# Patient Record
Sex: MALE | Race: WHITE | Employment: FULL TIME | URBAN - METROPOLITAN AREA
[De-identification: names, ages, dates, MRNs, and addresses within clinical notes are randomized per-mention and may not be internally consistent; named-entity substitution may affect disease eponyms.]

---

## 2017-02-02 ENCOUNTER — OFFICE VISIT (OUTPATIENT)
Dept: FAMILY MEDICINE CLINIC | Age: 57
End: 2017-02-02

## 2017-02-02 VITALS
DIASTOLIC BLOOD PRESSURE: 74 MMHG | WEIGHT: 187.6 LBS | BODY MASS INDEX: 26.16 KG/M2 | TEMPERATURE: 97.5 F | HEART RATE: 59 BPM | SYSTOLIC BLOOD PRESSURE: 136 MMHG

## 2017-02-02 DIAGNOSIS — J00 ACUTE NASOPHARYNGITIS: ICD-10-CM

## 2017-02-02 DIAGNOSIS — Z00.00 PHYSICAL EXAM, ROUTINE: Primary | ICD-10-CM

## 2017-02-02 DIAGNOSIS — Z13.6 SCREENING FOR CARDIOVASCULAR CONDITION: ICD-10-CM

## 2017-02-02 DIAGNOSIS — Z13.1 SCREENING FOR DIABETES MELLITUS: ICD-10-CM

## 2017-02-02 DIAGNOSIS — Z23 NEED FOR TDAP VACCINATION: ICD-10-CM

## 2017-02-02 DIAGNOSIS — Z13.21 ENCOUNTER FOR VITAMIN DEFICIENCY SCREENING: ICD-10-CM

## 2017-02-02 LAB
A/G RATIO: 2.2 (ref 1.1–2.2)
ALBUMIN SERPL-MCNC: 4.7 G/DL (ref 3.4–5)
ALP BLD-CCNC: 62 U/L (ref 40–129)
ALT SERPL-CCNC: 28 U/L (ref 10–40)
ANION GAP SERPL CALCULATED.3IONS-SCNC: 15 MMOL/L (ref 3–16)
AST SERPL-CCNC: 20 U/L (ref 15–37)
BILIRUB SERPL-MCNC: 0.8 MG/DL (ref 0–1)
BUN BLDV-MCNC: 16 MG/DL (ref 7–20)
CALCIUM SERPL-MCNC: 9.4 MG/DL (ref 8.3–10.6)
CHLORIDE BLD-SCNC: 105 MMOL/L (ref 99–110)
CHOLESTEROL, TOTAL: 203 MG/DL (ref 0–199)
CO2: 24 MMOL/L (ref 21–32)
CREAT SERPL-MCNC: 1 MG/DL (ref 0.9–1.3)
GFR AFRICAN AMERICAN: >60
GFR NON-AFRICAN AMERICAN: >60
GLOBULIN: 2.1 G/DL
GLUCOSE BLD-MCNC: 106 MG/DL (ref 70–99)
HDLC SERPL-MCNC: 52 MG/DL (ref 40–60)
LDL CHOLESTEROL CALCULATED: 125 MG/DL
POTASSIUM SERPL-SCNC: 4.7 MMOL/L (ref 3.5–5.1)
SODIUM BLD-SCNC: 144 MMOL/L (ref 136–145)
TOTAL PROTEIN: 6.8 G/DL (ref 6.4–8.2)
TRIGL SERPL-MCNC: 132 MG/DL (ref 0–150)
VITAMIN D 25-HYDROXY: 38.4 NG/ML
VLDLC SERPL CALC-MCNC: 26 MG/DL

## 2017-02-02 PROCEDURE — 36415 COLL VENOUS BLD VENIPUNCTURE: CPT | Performed by: FAMILY MEDICINE

## 2017-02-02 PROCEDURE — 90715 TDAP VACCINE 7 YRS/> IM: CPT | Performed by: FAMILY MEDICINE

## 2017-02-02 PROCEDURE — 99396 PREV VISIT EST AGE 40-64: CPT | Performed by: FAMILY MEDICINE

## 2017-02-02 PROCEDURE — 90471 IMMUNIZATION ADMIN: CPT | Performed by: FAMILY MEDICINE

## 2017-02-02 RX ORDER — FLUTICASONE PROPIONATE 50 MCG
1 SPRAY, SUSPENSION (ML) NASAL DAILY
Qty: 1 BOTTLE | Refills: 0 | Status: SHIPPED | OUTPATIENT
Start: 2017-02-02 | End: 2019-01-14

## 2017-06-15 ENCOUNTER — OFFICE VISIT (OUTPATIENT)
Dept: FAMILY MEDICINE CLINIC | Age: 57
End: 2017-06-15

## 2017-06-15 VITALS
BODY MASS INDEX: 27.11 KG/M2 | HEART RATE: 74 BPM | WEIGHT: 194.4 LBS | TEMPERATURE: 97 F | DIASTOLIC BLOOD PRESSURE: 84 MMHG | SYSTOLIC BLOOD PRESSURE: 128 MMHG

## 2017-06-15 DIAGNOSIS — M54.2 NECK PAIN: Primary | ICD-10-CM

## 2017-06-15 DIAGNOSIS — Z72.0 TOBACCO USE: ICD-10-CM

## 2017-06-15 PROCEDURE — 3017F COLORECTAL CA SCREEN DOC REV: CPT | Performed by: FAMILY MEDICINE

## 2017-06-15 PROCEDURE — 99214 OFFICE O/P EST MOD 30 MIN: CPT | Performed by: FAMILY MEDICINE

## 2017-06-15 PROCEDURE — G8427 DOCREV CUR MEDS BY ELIG CLIN: HCPCS | Performed by: FAMILY MEDICINE

## 2017-06-15 PROCEDURE — G8419 CALC BMI OUT NRM PARAM NOF/U: HCPCS | Performed by: FAMILY MEDICINE

## 2017-06-15 PROCEDURE — 4004F PT TOBACCO SCREEN RCVD TLK: CPT | Performed by: FAMILY MEDICINE

## 2017-06-15 RX ORDER — VARENICLINE TARTRATE 25 MG
KIT ORAL
Qty: 1 EACH | Refills: 0 | Status: SHIPPED | OUTPATIENT
Start: 2017-06-15 | End: 2018-04-18 | Stop reason: SDUPTHER

## 2017-06-15 RX ORDER — NAPROXEN 500 MG/1
500 TABLET ORAL 2 TIMES DAILY WITH MEALS
Qty: 60 TABLET | Refills: 2 | Status: SHIPPED | OUTPATIENT
Start: 2017-06-15 | End: 2019-01-14

## 2017-06-15 RX ORDER — CYCLOBENZAPRINE HCL 10 MG
10 TABLET ORAL EVERY 8 HOURS PRN
Qty: 30 TABLET | Refills: 2 | Status: SHIPPED | OUTPATIENT
Start: 2017-06-15 | End: 2018-06-18

## 2017-12-14 ENCOUNTER — OFFICE VISIT (OUTPATIENT)
Dept: FAMILY MEDICINE CLINIC | Age: 57
End: 2017-12-14

## 2017-12-14 VITALS
OXYGEN SATURATION: 99 % | SYSTOLIC BLOOD PRESSURE: 114 MMHG | DIASTOLIC BLOOD PRESSURE: 82 MMHG | HEIGHT: 71 IN | WEIGHT: 192 LBS | BODY MASS INDEX: 26.88 KG/M2 | HEART RATE: 72 BPM

## 2017-12-14 DIAGNOSIS — S93.492A SPRAIN OF ANTERIOR TALOFIBULAR LIGAMENT OF LEFT ANKLE, INITIAL ENCOUNTER: ICD-10-CM

## 2017-12-14 DIAGNOSIS — Z23 NEED FOR INFLUENZA VACCINATION: ICD-10-CM

## 2017-12-14 DIAGNOSIS — Z72.0 TOBACCO USE: ICD-10-CM

## 2017-12-14 DIAGNOSIS — Z23 NEED FOR PNEUMOCOCCAL VACCINATION: ICD-10-CM

## 2017-12-14 DIAGNOSIS — Z13.1 SCREENING FOR DIABETES MELLITUS: ICD-10-CM

## 2017-12-14 DIAGNOSIS — Z00.00 PHYSICAL EXAM, ANNUAL: Primary | ICD-10-CM

## 2017-12-14 DIAGNOSIS — Z13.6 SCREENING FOR CARDIOVASCULAR CONDITION: ICD-10-CM

## 2017-12-14 LAB
A/G RATIO: 1.9 (CALC) (ref 0.8–2.6)
ALBUMIN SERPL-MCNC: 4.6 GM/DL (ref 3.5–5.2)
ALP BLD-CCNC: 57 U/L (ref 23–144)
ALT SERPL-CCNC: 26 U/L (ref 0–60)
AST SERPL-CCNC: 21 U/L (ref 0–46)
BILIRUB SERPL-MCNC: 0.7 MG/DL (ref 0–1.2)
BUN / CREAT RATIO: 15 (CALC) (ref 7–25)
BUN BLDV-MCNC: 15 MG/DL (ref 3–29)
CALCIUM SERPL-MCNC: 9.4 MG/DL (ref 8.5–10.5)
CHLORIDE BLD-SCNC: 100 MEQ/L (ref 96–110)
CHOLESTEROL, TOTAL: 175 MG/DL
CO2: 31 MEQ/L (ref 19–32)
COPY(IES) SENT TO:: NORMAL
CREAT SERPL-MCNC: 1 MG/DL
GFR SERPL CREATININE-BSD FRML MDRD: 84 ML/MIN/1.73M2
GLOBULIN: 2.4 GM/DL (CALC) (ref 1.9–3.6)
GLUCOSE BLD-MCNC: 94 MG/DL
HDLC SERPL-MCNC: 52 MG/DL
LDL CHOLESTEROL: 101 MG/DL (CALC)
POTASSIUM SERPL-SCNC: 4.4 MEQ/L (ref 3.4–5.3)
SODIUM BLD-SCNC: 141 MEQ/L (ref 135–148)
TOTAL PROTEIN: 7 GM/DL (ref 6–8.3)
TRIGL SERPL-MCNC: 108 MG/DL
VLDLC SERPL CALC-MCNC: 22 MG/DL (CALC) (ref 4–38)

## 2017-12-14 PROCEDURE — 90732 PPSV23 VACC 2 YRS+ SUBQ/IM: CPT | Performed by: FAMILY MEDICINE

## 2017-12-14 PROCEDURE — 90688 IIV4 VACCINE SPLT 0.5 ML IM: CPT | Performed by: FAMILY MEDICINE

## 2017-12-14 PROCEDURE — 36415 COLL VENOUS BLD VENIPUNCTURE: CPT | Performed by: FAMILY MEDICINE

## 2017-12-14 PROCEDURE — 90472 IMMUNIZATION ADMIN EACH ADD: CPT | Performed by: FAMILY MEDICINE

## 2017-12-14 PROCEDURE — 99396 PREV VISIT EST AGE 40-64: CPT | Performed by: FAMILY MEDICINE

## 2017-12-14 PROCEDURE — 90471 IMMUNIZATION ADMIN: CPT | Performed by: FAMILY MEDICINE

## 2017-12-14 ASSESSMENT — PATIENT HEALTH QUESTIONNAIRE - PHQ9
SUM OF ALL RESPONSES TO PHQ9 QUESTIONS 1 & 2: 0
SUM OF ALL RESPONSES TO PHQ QUESTIONS 1-9: 0
1. LITTLE INTEREST OR PLEASURE IN DOING THINGS: 0
2. FEELING DOWN, DEPRESSED OR HOPELESS: 0

## 2017-12-14 NOTE — PROGRESS NOTES
Subjective:   Chief Complaint:     Jose Rascon is a 56 y.o. male who presents for a  physical examination. History of Present Illness:      Feels well. Active in [employer]. Sleeps well. Ankle Pain: Patient complains of left ankle pain. Onset of the symptoms was 3 months ago. Inciting event: none known. Current symptoms include ability to bear weight, but with some pain and pain at the lateral aspect of the ankle. Aggravating symptoms: dorsiflexion. Patient's overall course: symptoms have progressed to a point and plateaued. Patient has had no prior ankle problems. Previous visits for this problem: none. Evaluation to date: none. Treatment to date: avoidance of offending activity. Past Medical History:   Diagnosis Date    Hypertension     MRSA (methicillin resistant Staphylococcus aureus) \"2010 And Early 2012\"    \"Butt Twice\"    Neck problem     \"Get Adjusted As Needed  Sees Dr. Opal Pepper"    Pneumonia \"As A Child\"    No Current Symtoms    Shortness of breath on exertion     Tinnitus of both ears         Review of patient's past surgical history indicates:     Past Surgical History:   Procedure Laterality Date    CARPAL TUNNEL RELEASE  2005    Right    CATARACT REMOVAL      bid    COLONOSCOPY  2010    due 2020    FINGER SURGERY  1976    Reattached Right Little Finger Due To Table Saw Accident   [facility]  9/11/2012    Left     OTHER SURGICAL HISTORY      Family Physician Is Dr. Lucero Osorio. [place], [place] SHOULDER ARTHROSCOPY  2000's    Left     TONSILLECTOMY  1968    VASECTOMY  1999                                                   Current Outpatient Prescriptions   Medication Sig Dispense Refill    cyclobenzaprine (FLEXERIL) 10 MG tablet Take 1 tablet by mouth every 8 hours as needed for Muscle spasms 30 tablet 2    varenicline (CHANTIX STARTING MONTH PAK) 0.5 MG X 11 & 1 MG X 42 tablet By mouth.  1 each 0    naproxen (NAPROSYN) 500 MG tablet Take 1 tablet by mouth 2 times daily (with meals) 60 tablet 2    fluticasone (FLONASE) 50 MCG/ACT nasal spray 1 spray by Nasal route daily 1 Bottle 0    Ibuprofen (ADVIL PO) Take  by mouth as needed. Over The Counter       UNABLE TO FIND Force factor          No current facility-administered medications for this visit. No Known Allergies    Social History   Substance Use Topics    Smoking status: Current Every Day Smoker     Packs/day: 1.00     Years: 34.00     Types: Cigarettes     Last attempt to quit: 1/7/2013    Smokeless tobacco: Current User    Alcohol use No        Family History   Problem Relation Age of Onset    Heart Disease Mother     Hypertension Mother     Elevated Lipids Mother     Cancer Mother      \"Breast Cancer\"    Mental Illness Mother      \"Anxiety\"    Cancer Sister      \"Thyroid Cancer\"    Hypertension Father     Elevated Lipids Father     Other Father      \"Stents In Legs\"    Heart Disease Father      \"Heart Stent\"   [facility] Other Brother      \"Migraines\"   [facility] Migraines Brother     Thyroid Disease Sister         Review Of Systems    Skin: no abnormal pigmentation, rash, scaling, itching, masses, hair or nail changes  Eyes: negative  Ears/Nose/Throat: negative  Respiratory: negative  Cardiovascular: negative  Gastrointestinal: negative  Genitourinary: negative  Musculoskeletal: negative  Neurologic: negative  Psychiatric: negative  Hematologic/Lymphatic/Immunologic: negative  Endocrine: negative       Objective:      /82 (Site: Left Arm, Position: Sitting, Cuff Size: Medium Adult)   Pulse 72   Ht 5' 10.5\" (1.791 m)   Wt 192 lb (87.1 kg)   SpO2 99%   BMI 27.16 kg/m²   General appearance - healthy, alert, no distress  Skin - Skin color, texture, turgor normal. No rashes or lesions. Head - Normocephalic. No masses, lesions, tenderness or abnormalities  Eyes - conjunctivae/corneas clear. PERRL, EOM's intact.   Ears - External ears normal. Canals clear. TM's normal.  Nose/Sinuses - Nares normal. Septum midline. Mucosa normal. No drainage or sinus tenderness. Oropharynx - Lips, mucosa, and tongue normal. Teeth and gums normal.   Neck - Neck supple. No adenopathy. Thyroid symmetric, normal size,  Back - Back symmetric, no curvature. ROM normal. No CVA tenderness. Lungs - Percussion normal. Good diaphragmatic excursion. Lungs clear  Heart - Regular rate and rhythm, with no rub, murmur or gallop noted. Abdomen - Abdomen soft, non-tender. BS normal. No masses, organomegaly  Extremities - Extremities normal. No deformities, edema, or skin discoloration. Left ankle with FROM. No tenderness or swelling. Pain with dorsiflexion . Normal gait  Musculoskeletal - Spine ROM normal. Muscular strength intact. Peripheral pulses - radial=4/4  Neuro - Gait normal. Reflexes normal and symmetric. Sensation grossly normal.  No focal weakness           Assessment:        1. Physical exam, annual     2. Need for influenza vaccination  INFLUENZA, QUADV, 3 YRS AND OLDER, IM, MDV, 0.5ML (FLUZONE QUADV)   3. Need for pneumococcal vaccination  Pneumococcal polysaccharide vaccine 23-valent >= 1yo subcutaneous/IM (PNEUMOVAX 23)   4. Sprain of anterior talofibular ligament of left ankle, initial encounter  XR ANKLE LEFT (MIN 3 VIEWS)   5. Tobacco use     6. Screening for cardiovascular condition  Lipid Panel   7.  Screening for diabetes mellitus  Comprehensive Metabolic Panel                 Plan:        See orders  Exercises given

## 2017-12-14 NOTE — PATIENT INSTRUCTIONS
Patient Education        Ankle Sprain: Rehab Exercises  Your Care Instructions  Here are some examples of typical rehabilitation exercises for your condition. Start each exercise slowly. Ease off the exercise if you start to have pain. Your doctor or physical therapist will tell you when you can start these exercises and which ones will work best for you. How to do the exercises  \"Alphabet\" exercise    1. Trace the alphabet with your toe. This helps your ankle move in all directions. Side-to-side knee swing exercise    1. Sit in a chair with your foot flat on the floor. 2. Slowly move your knee from side to side. Keep your foot pressed flat. 3. Continue this exercise for 2 to 3 minutes. Towel curl    1. While sitting, place your foot on a towel on the floor. Scrunch the towel toward you with your toes. 2. Then use your toes to push the towel away from you. 3. To make this exercise more challenging you can put something on the other end of the towel. A can of soup is about the right weight for this. Towel stretch    1. Sit with your legs extended and knees straight. 2. Place a towel around your foot just under the toes. 3. Hold each end of the towel in each hand, with your hands above your knees. 4. Pull back with the towel so that your foot stretches toward you. 5. Hold the position for at least 15 to 30 seconds. 6. Repeat 2 to 4 times a session. Do up to 5 sessions a day. Ankle eversion exercise    1. Start by sitting with your foot flat on the floor. Push your foot outward against a wall or a piece of furniture that doesn't move. Hold for about 6 seconds, and relax. Repeat 8 to 12 times. 2. After you feel comfortable with this, try using rubber tubing looped around the outside of your feet for resistance. Push your foot out to the side against the tubing, and then count to 10 as you slowly bring your foot back to the middle. Repeat 8 to 12 times. Isometric opposition exercises    1.  While sitting, put your feet together flat on the floor. 2. Press your injured foot inward against your other foot. Hold for about 6 seconds, and relax. Repeat 8 to 12 times. 3. Then place the heel of your other foot on top of the injured one. Push down with the top heel while trying to push up with your injured foot. Hold for about 6 seconds, and relax. Repeat 8 to 12 times. Resisted ankle inversion    1. Sit on the floor with your good leg crossed over your other leg. 2. Hold both ends of an exercise band and loop the band around the inside of your affected foot. Then press your other foot against the band. 3. Keeping your legs crossed, slowly push your affected foot against the band so that foot moves away from your other foot. Then slowly relax. 4. Repeat 8 to 12 times. Resisted ankle eversion    1. Sit on the floor with your legs straight. 2. Hold both ends of an exercise band and loop the band around the outside of your affected foot. Then press your other foot against the band. 3. Keeping your leg straight, slowly push your affected foot outward against the band and away from your other foot without letting your leg rotate. Then slowly relax. 4. Repeat 8 to 12 times. Resisted ankle dorsiflexion    1. Tie the ends of an exercise band together to form a loop. Attach one end of the loop to a secure object or shut a door on it to hold it in place. (Or you can have someone hold one end of the loop to provide resistance.)  2. While sitting on the floor or in a chair, loop the other end of the band over the top of your affected foot. 3. Keeping your knee and leg straight, slowly flex your foot to pull back on the exercise band, and then slowly relax. 4. Repeat 8 to 12 times. Single-leg balance    1. Stand on a flat surface with your arms stretched out to your sides like you are making the letter \"T. \" Then lift your good leg off the floor, bending it at the knee.  If you are not steady on your feet, use one hand to hold on to a chair, counter, or wall. 2. Standing on the leg with your affected ankle, keep that knee straight. Try to balance on that leg for up to 30 seconds. Then rest for up to 10 seconds. 3. Repeat 6 to 8 times. 4. When you can balance on your affected leg for 30 seconds with your eyes open, try to balance on it with your eyes closed. 5. When you can do this exercise with your eyes closed for 30 seconds and with ease and no pain, try standing on a pillow or piece of foam, and repeat steps 1 through 4. Follow-up care is a key part of your treatment and safety. Be sure to make and go to all appointments, and call your doctor if you are having problems. It's also a good idea to know your test results and keep a list of the medicines you take. Where can you learn more? Go to https://chpepiceweb.[employer] org and sign in to your [employer] account. Enter Marianna Wong in the aisle411 box to learn more about \"Ankle Sprain: Rehab Exercises. \"     If you do not have an account, please click on the \"Sign Up Now\" link. Current as of: March 21, 2017  Content Version: 11.4  © 6328-9848 Healthwise, Incorporated. Care instructions adapted under license by [facility] ([facility]). If you have questions about a medical condition or this instruction, always ask your healthcare professional. [place] [address] any warranty or liability for your use of this information.

## 2018-01-09 ENCOUNTER — TELEPHONE (OUTPATIENT)
Dept: FAMILY MEDICINE CLINIC | Age: 58
End: 2018-01-09

## 2018-01-10 ENCOUNTER — OFFICE VISIT (OUTPATIENT)
Dept: FAMILY MEDICINE CLINIC | Age: 58
End: 2018-01-10

## 2018-01-10 VITALS
OXYGEN SATURATION: 97 % | HEIGHT: 70 IN | TEMPERATURE: 96.8 F | BODY MASS INDEX: 28.03 KG/M2 | HEART RATE: 84 BPM | SYSTOLIC BLOOD PRESSURE: 138 MMHG | DIASTOLIC BLOOD PRESSURE: 88 MMHG | WEIGHT: 195.8 LBS

## 2018-01-10 DIAGNOSIS — M65.4 DE QUERVAIN'S TENOSYNOVITIS, LEFT: Primary | ICD-10-CM

## 2018-01-10 PROCEDURE — 99213 OFFICE O/P EST LOW 20 MIN: CPT | Performed by: FAMILY MEDICINE

## 2018-04-18 ENCOUNTER — OFFICE VISIT (OUTPATIENT)
Dept: FAMILY MEDICINE CLINIC | Age: 58
End: 2018-04-18

## 2018-04-18 VITALS
SYSTOLIC BLOOD PRESSURE: 142 MMHG | TEMPERATURE: 97 F | OXYGEN SATURATION: 94 % | WEIGHT: 195.4 LBS | BODY MASS INDEX: 28.04 KG/M2 | HEART RATE: 65 BPM | DIASTOLIC BLOOD PRESSURE: 82 MMHG

## 2018-04-18 DIAGNOSIS — J22 LOWER RESPIRATORY INFECTION: Primary | ICD-10-CM

## 2018-04-18 DIAGNOSIS — Z72.0 TOBACCO USE: ICD-10-CM

## 2018-04-18 PROCEDURE — 99214 OFFICE O/P EST MOD 30 MIN: CPT | Performed by: FAMILY MEDICINE

## 2018-04-18 RX ORDER — DEXTROMETHORPHAN HYDROBROMIDE AND PROMETHAZINE HYDROCHLORIDE 15; 6.25 MG/5ML; MG/5ML
5 SYRUP ORAL 4 TIMES DAILY PRN
Qty: 240 ML | Refills: 0 | Status: SHIPPED | OUTPATIENT
Start: 2018-04-18 | End: 2018-06-18

## 2018-04-18 RX ORDER — AMOXICILLIN AND CLAVULANATE POTASSIUM 875; 125 MG/1; MG/1
1 TABLET, FILM COATED ORAL 2 TIMES DAILY
Qty: 20 TABLET | Refills: 0 | Status: SHIPPED | OUTPATIENT
Start: 2018-04-18 | End: 2018-04-28

## 2018-04-18 RX ORDER — VARENICLINE TARTRATE 25 MG
KIT ORAL
Qty: 1 EACH | Refills: 0 | Status: SHIPPED | OUTPATIENT
Start: 2018-04-18 | End: 2018-06-18

## 2018-04-18 RX ORDER — BENZONATATE 200 MG/1
200 CAPSULE ORAL 3 TIMES DAILY PRN
Qty: 30 CAPSULE | Refills: 0 | Status: SHIPPED | OUTPATIENT
Start: 2018-04-18 | End: 2018-06-18

## 2018-06-18 ENCOUNTER — OFFICE VISIT (OUTPATIENT)
Dept: FAMILY MEDICINE CLINIC | Age: 58
End: 2018-06-18

## 2018-06-18 VITALS
SYSTOLIC BLOOD PRESSURE: 136 MMHG | TEMPERATURE: 96.9 F | WEIGHT: 195.6 LBS | OXYGEN SATURATION: 86 % | DIASTOLIC BLOOD PRESSURE: 86 MMHG | BODY MASS INDEX: 28.07 KG/M2 | HEART RATE: 86 BPM

## 2018-06-18 DIAGNOSIS — I10 ESSENTIAL HYPERTENSION: Primary | ICD-10-CM

## 2018-06-18 DIAGNOSIS — F17.200 TOBACCO USE DISORDER: ICD-10-CM

## 2018-06-18 PROCEDURE — 99213 OFFICE O/P EST LOW 20 MIN: CPT | Performed by: FAMILY MEDICINE

## 2018-06-18 RX ORDER — LISINOPRIL 10 MG/1
10 TABLET ORAL DAILY
Qty: 30 TABLET | Refills: 0 | Status: SHIPPED | OUTPATIENT
Start: 2018-06-18 | End: 2018-07-26 | Stop reason: SDUPTHER

## 2018-06-18 ASSESSMENT — PATIENT HEALTH QUESTIONNAIRE - PHQ9
2. FEELING DOWN, DEPRESSED OR HOPELESS: 0
1. LITTLE INTEREST OR PLEASURE IN DOING THINGS: 0
SUM OF ALL RESPONSES TO PHQ QUESTIONS 1-9: 0
SUM OF ALL RESPONSES TO PHQ9 QUESTIONS 1 & 2: 0

## 2018-07-02 ENCOUNTER — NURSE ONLY (OUTPATIENT)
Dept: FAMILY MEDICINE CLINIC | Age: 58
End: 2018-07-02

## 2018-07-02 VITALS — DIASTOLIC BLOOD PRESSURE: 80 MMHG | SYSTOLIC BLOOD PRESSURE: 110 MMHG

## 2018-07-02 DIAGNOSIS — I10 ESSENTIAL HYPERTENSION: ICD-10-CM

## 2018-07-02 PROCEDURE — 36415 COLL VENOUS BLD VENIPUNCTURE: CPT | Performed by: FAMILY MEDICINE

## 2018-07-02 NOTE — PROGRESS NOTES
Pt came in for 2 week check up with BP and draw blood for a BMP. Blood pressure was 110/80. Pt tolerated phlebotomy well.

## 2018-07-03 LAB
BUN / CREAT RATIO: 20 (CALC) (ref 7–25)
BUN BLDV-MCNC: 18 MG/DL (ref 3–29)
CALCIUM SERPL-MCNC: 9.4 MG/DL (ref 8.5–10.5)
CHLORIDE BLD-SCNC: 102 MEQ/L (ref 96–110)
CO2: 21 MEQ/L (ref 19–32)
COMMENT: ABNORMAL
COPY(IES) SENT TO:: NORMAL
CREAT SERPL-MCNC: 0.9 MG/DL
GFR SERPL CREATININE-BSD FRML MDRD: 95 ML/MIN/1.73M2
GLUCOSE BLD-MCNC: 116 MG/DL
POTASSIUM SERPL-SCNC: 4.2 MEQ/L (ref 3.4–5.3)
SODIUM BLD-SCNC: 140 MEQ/L (ref 135–148)

## 2018-07-26 DIAGNOSIS — I10 ESSENTIAL HYPERTENSION: ICD-10-CM

## 2018-07-26 RX ORDER — LISINOPRIL 10 MG/1
10 TABLET ORAL DAILY
Qty: 30 TABLET | Refills: 5 | Status: SHIPPED | OUTPATIENT
Start: 2018-07-26 | End: 2019-01-14 | Stop reason: SDUPTHER

## 2019-01-14 ENCOUNTER — OFFICE VISIT (OUTPATIENT)
Dept: FAMILY MEDICINE CLINIC | Age: 59
End: 2019-01-14
Payer: COMMERCIAL

## 2019-01-14 VITALS
DIASTOLIC BLOOD PRESSURE: 68 MMHG | BODY MASS INDEX: 28.18 KG/M2 | SYSTOLIC BLOOD PRESSURE: 118 MMHG | OXYGEN SATURATION: 98 % | WEIGHT: 196.8 LBS | HEIGHT: 70 IN | HEART RATE: 99 BPM | TEMPERATURE: 95.3 F

## 2019-01-14 DIAGNOSIS — I10 ESSENTIAL HYPERTENSION: ICD-10-CM

## 2019-01-14 DIAGNOSIS — Z13.1 SCREENING FOR DIABETES MELLITUS: ICD-10-CM

## 2019-01-14 DIAGNOSIS — Z13.6 SCREENING FOR CARDIOVASCULAR CONDITION: ICD-10-CM

## 2019-01-14 DIAGNOSIS — Z87.891 PERSONAL HISTORY OF TOBACCO USE: ICD-10-CM

## 2019-01-14 DIAGNOSIS — Z00.00 PHYSICAL EXAM, ANNUAL: Primary | ICD-10-CM

## 2019-01-14 DIAGNOSIS — Z12.2 ENCOUNTER FOR SCREENING FOR CANCER OF RESPIRATORY ORGANS: ICD-10-CM

## 2019-01-14 PROCEDURE — 36415 COLL VENOUS BLD VENIPUNCTURE: CPT | Performed by: FAMILY MEDICINE

## 2019-01-14 PROCEDURE — G0296 VISIT TO DETERM LDCT ELIG: HCPCS | Performed by: FAMILY MEDICINE

## 2019-01-14 PROCEDURE — 99396 PREV VISIT EST AGE 40-64: CPT | Performed by: FAMILY MEDICINE

## 2019-01-14 RX ORDER — LISINOPRIL 10 MG/1
10 TABLET ORAL DAILY
Qty: 30 TABLET | Refills: 5 | Status: SHIPPED | OUTPATIENT
Start: 2019-01-14 | End: 2019-08-29 | Stop reason: SDUPTHER

## 2019-01-14 ASSESSMENT — PATIENT HEALTH QUESTIONNAIRE - PHQ9
SUM OF ALL RESPONSES TO PHQ QUESTIONS 1-9: 0
1. LITTLE INTEREST OR PLEASURE IN DOING THINGS: 0
2. FEELING DOWN, DEPRESSED OR HOPELESS: 0
SUM OF ALL RESPONSES TO PHQ9 QUESTIONS 1 & 2: 0
SUM OF ALL RESPONSES TO PHQ QUESTIONS 1-9: 0

## 2019-01-15 LAB
A/G RATIO: 2 (CALC) (ref 0.8–2.6)
ALBUMIN SERPL-MCNC: 4.5 GM/DL (ref 3.5–5.2)
ALP BLD-CCNC: 51 U/L (ref 23–144)
ALT SERPL-CCNC: 52 U/L (ref 0–60)
AST SERPL-CCNC: 26 U/L (ref 0–46)
BILIRUB SERPL-MCNC: 1 MG/DL (ref 0–1.2)
BUN / CREAT RATIO: 13 (CALC) (ref 7–25)
BUN BLDV-MCNC: 13 MG/DL (ref 3–29)
CALCIUM SERPL-MCNC: 9.6 MG/DL (ref 8.5–10.5)
CHLORIDE BLD-SCNC: 102 MEQ/L (ref 96–110)
CHOLESTEROL, TOTAL: 186 MG/DL
CO2: 28 MEQ/L (ref 19–32)
COPY(IES) SENT TO:: NORMAL
CREAT SERPL-MCNC: 1 MG/DL
GFR SERPL CREATININE-BSD FRML MDRD: 84 ML/MIN/1.73M2
GLOBULIN: 2.2 GM/DL (CALC) (ref 1.9–3.6)
GLUCOSE BLD-MCNC: 104 MG/DL
HDLC SERPL-MCNC: 47 MG/DL
LDL CHOLESTEROL: 118 MG/DL (CALC)
POTASSIUM SERPL-SCNC: 4.7 MEQ/L (ref 3.4–5.3)
SODIUM BLD-SCNC: 142 MEQ/L (ref 135–148)
TOTAL PROTEIN: 6.7 GM/DL (ref 6–8.3)
TRIGL SERPL-MCNC: 105 MG/DL
VLDLC SERPL CALC-MCNC: 21 MG/DL (CALC) (ref 4–38)

## 2019-01-21 ENCOUNTER — HOSPITAL ENCOUNTER (OUTPATIENT)
Dept: CT IMAGING | Age: 59
Discharge: HOME | End: 2019-01-21
Payer: COMMERCIAL

## 2019-01-21 DIAGNOSIS — Z87.891 PERSONAL HISTORY OF TOBACCO USE: ICD-10-CM

## 2019-01-21 PROCEDURE — G0297 LDCT FOR LUNG CA SCREEN: HCPCS

## 2019-02-22 ENCOUNTER — TELEPHONE (OUTPATIENT)
Dept: FAMILY MEDICINE CLINIC | Age: 59
End: 2019-02-22

## 2019-02-27 ENCOUNTER — OFFICE VISIT (OUTPATIENT)
Dept: FAMILY MEDICINE CLINIC | Age: 59
End: 2019-02-27
Payer: COMMERCIAL

## 2019-02-27 VITALS
OXYGEN SATURATION: 95 % | WEIGHT: 197.8 LBS | TEMPERATURE: 96 F | DIASTOLIC BLOOD PRESSURE: 76 MMHG | SYSTOLIC BLOOD PRESSURE: 124 MMHG | HEART RATE: 100 BPM | BODY MASS INDEX: 28.18 KG/M2

## 2019-02-27 DIAGNOSIS — Z72.0 TOBACCO USE: ICD-10-CM

## 2019-02-27 DIAGNOSIS — I10 ESSENTIAL HYPERTENSION: Primary | ICD-10-CM

## 2019-02-27 PROCEDURE — 99214 OFFICE O/P EST MOD 30 MIN: CPT | Performed by: FAMILY MEDICINE

## 2019-02-27 ASSESSMENT — PATIENT HEALTH QUESTIONNAIRE - PHQ9
SUM OF ALL RESPONSES TO PHQ QUESTIONS 1-9: 0
SUM OF ALL RESPONSES TO PHQ9 QUESTIONS 1 & 2: 0
1. LITTLE INTEREST OR PLEASURE IN DOING THINGS: 0
2. FEELING DOWN, DEPRESSED OR HOPELESS: 0
SUM OF ALL RESPONSES TO PHQ QUESTIONS 1-9: 0

## 2019-03-01 ENCOUNTER — INITIAL CONSULT (OUTPATIENT)
Dept: CARDIOLOGY CLINIC | Age: 59
End: 2019-03-01
Payer: COMMERCIAL

## 2019-03-01 VITALS
HEIGHT: 70 IN | DIASTOLIC BLOOD PRESSURE: 88 MMHG | WEIGHT: 200.8 LBS | BODY MASS INDEX: 28.75 KG/M2 | SYSTOLIC BLOOD PRESSURE: 134 MMHG | HEART RATE: 70 BPM

## 2019-03-01 DIAGNOSIS — Z72.0 TOBACCO USE: ICD-10-CM

## 2019-03-01 DIAGNOSIS — Z72.0 TOBACCO CHEW USE: ICD-10-CM

## 2019-03-01 DIAGNOSIS — I73.9 CLAUDICATION (HCC): ICD-10-CM

## 2019-03-01 DIAGNOSIS — I10 ESSENTIAL HYPERTENSION: ICD-10-CM

## 2019-03-01 DIAGNOSIS — R42 DIZZINESS: ICD-10-CM

## 2019-03-01 DIAGNOSIS — R93.1 ELEVATED CORONARY ARTERY CALCIUM SCORE: ICD-10-CM

## 2019-03-01 DIAGNOSIS — I25.10 ASCVD (ARTERIOSCLEROTIC CARDIOVASCULAR DISEASE): Primary | ICD-10-CM

## 2019-03-01 PROCEDURE — 93000 ELECTROCARDIOGRAM COMPLETE: CPT | Performed by: INTERNAL MEDICINE

## 2019-03-01 PROCEDURE — 99244 OFF/OP CNSLTJ NEW/EST MOD 40: CPT | Performed by: INTERNAL MEDICINE

## 2019-03-01 RX ORDER — ATORVASTATIN CALCIUM 40 MG/1
40 TABLET, FILM COATED ORAL DAILY
Qty: 90 TABLET | Refills: 1 | Status: SHIPPED | OUTPATIENT
Start: 2019-03-01 | End: 2019-04-19 | Stop reason: SDUPTHER

## 2019-03-01 RX ORDER — ASPIRIN 81 MG/1
81 TABLET ORAL DAILY
Qty: 90 TABLET | Refills: 1 | Status: SHIPPED | OUTPATIENT
Start: 2019-03-01 | End: 2020-08-14 | Stop reason: SDUPTHER

## 2019-03-01 RX ORDER — VARENICLINE TARTRATE 0.5 MG/1
.5-1 TABLET, FILM COATED ORAL SEE ADMIN INSTRUCTIONS
Qty: 57 TABLET | Refills: 0 | Status: SHIPPED | OUTPATIENT
Start: 2019-03-01 | End: 2019-03-04

## 2019-03-04 ENCOUNTER — TELEPHONE (OUTPATIENT)
Dept: FAMILY MEDICINE CLINIC | Age: 59
End: 2019-03-04

## 2019-03-04 ENCOUNTER — TELEPHONE (OUTPATIENT)
Dept: CARDIOLOGY CLINIC | Age: 59
End: 2019-03-04

## 2019-03-04 RX ORDER — VARENICLINE TARTRATE 25 MG
KIT ORAL
Qty: 1 EACH | Refills: 0 | Status: SHIPPED | OUTPATIENT
Start: 2019-03-04 | End: 2019-04-04

## 2019-03-18 ENCOUNTER — PROCEDURE VISIT (OUTPATIENT)
Dept: CARDIOLOGY CLINIC | Age: 59
End: 2019-03-18
Payer: COMMERCIAL

## 2019-03-18 DIAGNOSIS — Z72.0 TOBACCO USE: ICD-10-CM

## 2019-03-18 DIAGNOSIS — Z72.0 TOBACCO CHEW USE: ICD-10-CM

## 2019-03-18 DIAGNOSIS — I10 ESSENTIAL HYPERTENSION: ICD-10-CM

## 2019-03-18 DIAGNOSIS — I73.9 CLAUDICATION (HCC): Primary | ICD-10-CM

## 2019-03-18 DIAGNOSIS — R06.02 SOB (SHORTNESS OF BREATH): Primary | ICD-10-CM

## 2019-03-18 DIAGNOSIS — R42 DIZZINESS: ICD-10-CM

## 2019-03-18 DIAGNOSIS — I25.10 ASCVD (ARTERIOSCLEROTIC CARDIOVASCULAR DISEASE): ICD-10-CM

## 2019-03-18 LAB
LV EF: 58 %
LVEF MODALITY: NORMAL

## 2019-03-18 PROCEDURE — 93922 UPR/L XTREMITY ART 2 LEVELS: CPT | Performed by: INTERNAL MEDICINE

## 2019-03-18 PROCEDURE — 93925 LOWER EXTREMITY STUDY: CPT | Performed by: INTERNAL MEDICINE

## 2019-03-18 PROCEDURE — 93306 TTE W/DOPPLER COMPLETE: CPT | Performed by: INTERNAL MEDICINE

## 2019-03-19 ENCOUNTER — PROCEDURE VISIT (OUTPATIENT)
Dept: CARDIOLOGY CLINIC | Age: 59
End: 2019-03-19
Payer: COMMERCIAL

## 2019-03-19 DIAGNOSIS — R42 DIZZINESS: ICD-10-CM

## 2019-03-19 DIAGNOSIS — I25.10 ASCVD (ARTERIOSCLEROTIC CARDIOVASCULAR DISEASE): ICD-10-CM

## 2019-03-19 DIAGNOSIS — I25.10 ASCVD (ARTERIOSCLEROTIC CARDIOVASCULAR DISEASE): Primary | ICD-10-CM

## 2019-03-19 DIAGNOSIS — I10 ESSENTIAL HYPERTENSION: ICD-10-CM

## 2019-03-19 DIAGNOSIS — R07.9 CHEST PAIN, UNSPECIFIED TYPE: Primary | ICD-10-CM

## 2019-03-19 DIAGNOSIS — Z72.0 TOBACCO CHEW USE: ICD-10-CM

## 2019-03-19 DIAGNOSIS — Z72.0 TOBACCO USE: ICD-10-CM

## 2019-03-19 LAB
LV EF: 63 %
LVEF MODALITY: NORMAL

## 2019-03-19 PROCEDURE — A9500 TC99M SESTAMIBI: HCPCS | Performed by: INTERNAL MEDICINE

## 2019-03-19 PROCEDURE — 76706 US ABDL AORTA SCREEN AAA: CPT | Performed by: INTERNAL MEDICINE

## 2019-03-19 PROCEDURE — 93015 CV STRESS TEST SUPVJ I&R: CPT | Performed by: INTERNAL MEDICINE

## 2019-03-19 PROCEDURE — 78452 HT MUSCLE IMAGE SPECT MULT: CPT | Performed by: INTERNAL MEDICINE

## 2019-03-27 ENCOUNTER — TELEPHONE (OUTPATIENT)
Dept: CARDIOLOGY CLINIC | Age: 59
End: 2019-03-27

## 2019-04-04 RX ORDER — VARENICLINE TARTRATE 25 MG
KIT ORAL
Qty: 1 EACH | Refills: 0 | OUTPATIENT
Start: 2019-04-04

## 2019-04-04 RX ORDER — VARENICLINE TARTRATE 1 MG/1
1 TABLET, FILM COATED ORAL 2 TIMES DAILY
Qty: 60 TABLET | Refills: 1 | Status: SHIPPED | OUTPATIENT
Start: 2019-04-04 | End: 2020-07-15

## 2019-04-19 ENCOUNTER — OFFICE VISIT (OUTPATIENT)
Dept: CARDIOLOGY CLINIC | Age: 59
End: 2019-04-19
Payer: COMMERCIAL

## 2019-04-19 VITALS
HEIGHT: 70 IN | SYSTOLIC BLOOD PRESSURE: 130 MMHG | BODY MASS INDEX: 29.35 KG/M2 | HEART RATE: 80 BPM | DIASTOLIC BLOOD PRESSURE: 80 MMHG | WEIGHT: 205 LBS

## 2019-04-19 DIAGNOSIS — I10 ESSENTIAL HYPERTENSION: ICD-10-CM

## 2019-04-19 DIAGNOSIS — I25.10 ASCVD (ARTERIOSCLEROTIC CARDIOVASCULAR DISEASE): Primary | ICD-10-CM

## 2019-04-19 DIAGNOSIS — Z72.0 TOBACCO CHEW USE: ICD-10-CM

## 2019-04-19 DIAGNOSIS — R93.1 ELEVATED CORONARY ARTERY CALCIUM SCORE: ICD-10-CM

## 2019-04-19 DIAGNOSIS — Z72.0 TOBACCO USE: ICD-10-CM

## 2019-04-19 PROCEDURE — 99214 OFFICE O/P EST MOD 30 MIN: CPT | Performed by: INTERNAL MEDICINE

## 2019-04-19 RX ORDER — ATORVASTATIN CALCIUM 80 MG/1
80 TABLET, FILM COATED ORAL DAILY
Qty: 30 TABLET | Refills: 3 | Status: SHIPPED | OUTPATIENT
Start: 2019-04-19 | End: 2020-08-14 | Stop reason: SDUPTHER

## 2019-04-19 NOTE — PROGRESS NOTES
CARDIOLOGY  NOTE    Chief Complaint: High calcium score    HPI:   Andrew Vo is a 66y.o. year old who has history as noted below. Sushila Manuel He comes in for evaluation due to ongoing shortness of breath. He underwent screening with a cardiac CT showing calcium score of 1949.5 . Stress test shows no ischemia  . He does feel lightheaded and dizzy once in a while. He denies any chest pain but he has noted some shortness of breath especially when he is exerting. He plays martial arts notices that he gets winded. He also reports ongoing leg pain when he walks or exerts himself. Unfortunately he smokes a pack a day and also chews tobacco.HE is trying to cut down   Father had heart problems in his 46s, requiring multiple surgeries bypass and peripheral interventions. Mother also had heart problems. Sushila Manuel He drives a snow removal of truck for [employer]       Current Outpatient Medications   Medication Sig Dispense Refill    atorvastatin (LIPITOR) 80 MG tablet Take 1 tablet by mouth daily 30 tablet 3    varenicline (CHANTIX CONTINUING MONTH FILOMENA) 1 MG tablet Take 1 tablet by mouth 2 times daily 60 tablet 1    aspirin EC 81 MG EC tablet Take 1 tablet by mouth daily 90 tablet 1    nicotine polacrilex (NICORETTE) 2 MG gum Take 1 each by mouth as needed for Smoking cessation 110 each 3    lisinopril (PRINIVIL;ZESTRIL) 10 MG tablet Take 1 tablet by mouth daily 30 tablet 5    Ibuprofen (ADVIL PO) Take  by mouth as needed. Over The Counter       UNABLE TO FIND Force factor          No current facility-administered medications for this visit. Allergies:   Patient has no known allergies. Patient History:  Past Medical History:   Diagnosis Date    H/O cardiovascular stress test 03/19/2019    Normal study.  H/O Doppler abd aorta ultrasound 03/19/2019    No evidence of AAA within the visualized portions of the abdominal aorta.     H/O Doppler lower arterial ultrasound 03/18/2019    R SFA 20-49% stenosis, Right ABIs show Mild peripheral arterial disease.  H/O echocardiogram 2019    EF 55-60%, WNL    Hypertension     MRSA (methicillin resistant Staphylococcus aureus) \" And Early \"    \"Butt Twice\"    Neck problem     \"Get Adjusted As Needed  Sees Dr. Yenifer Kaiser"    Pneumonia \"As A Child\"    No Current Symtoms    Shortness of breath on exertion     Tinnitus of both ears      Past Surgical History:   Procedure Laterality Date    CARPAL TUNNEL RELEASE  2005    Right    CATARACT REMOVAL      bid    COLONOSCOPY      due 2020    FINGER SURGERY      Reattached Right Little Finger Due To Table Saw Accident    INGUINAL HERNIA REPAIR  2012    Left     OTHER SURGICAL HISTORY      Family Physician Is Dr. Margarita Harper.  [facility] In [address] ARTHROSCOPY  2000's    Left     SHOULDER DEBRIDEMENT Right 2018    TONSILLECTOMY  1968    VASECTOMY  1999     Family History   Problem Relation Age of Onset    Heart Disease Mother     Hypertension Mother    [facility] Elevated Lipids Mother     Cancer Mother         \"Breast Cancer\"    Mental Illness Mother         \"Anxiety\"   Steven Shine Sister         \"Thyroid Cancer\"    Hypertension Father     Elevated Lipids Father     Other Father         \"Stents In Legs\"    Heart Disease Father         \"Heart Stent\"   [facility] Other Brother         \"Migraines\"   [facility] Migraines Brother     Thyroid Disease Sister      Social History     Tobacco Use    Smoking status: Current Every Day Smoker     Packs/day: 0.50     Years: 34.00     Pack years: 17.00     Types: Cigarettes     Last attempt to quit: 2013     Years since quittin.2    Smokeless tobacco: Current User   Substance Use Topics    Alcohol use: No     Alcohol/week: 0.0 oz        Review of Systems:   · Constitutional: No Fever or Weight Loss   · Eyes: No Decreased Vision  · ENT: No Headaches, Hearing Loss or Vertigo  · Cardiovascular: as per note above oriented x 3, CN 2-12 normal, normal motor function, normal sensory function, no focal deficits noted   Psychiatric:  Speech and behavior appropriate       Medical decision making and Data review:  DATA:  No results found for: TROPONINT  BNP:  No results found for: PROBNP  PT/INR:  No results found for: PTINR  No results found for: LABA1C  Lab Results   Component Value Date    CHOL 186 01/14/2019    TRIG 105 01/14/2019    HDL 47 01/14/2019    LDLCALC 125 (H) 02/02/2017    LDLDIRECT 117 (H) 06/10/2015     Lab Results   Component Value Date    ALT 52 01/14/2019    AST 26 01/14/2019     TSH: No results found for: TSH  Lab Results   Component Value Date    AST 26 01/14/2019    ALT 52 01/14/2019    BILITOT 1.0 01/14/2019    ALKPHOS 51 01/14/2019     No results found for: PROBNP  No results found for: LABA1C  No results found for: WBC, HGB, HCT, PLT   Stress  3/19/19       Resting ECG  nsr    Resting HR:82 bpm  Resting BP:132/76 mmHg   Stress Protocol:Exercise - Bjorn    Peak HR:150 bpm                         HR response: Appropriate  Peak BP:172/86 mmHg                     BP response: Normal resting BP -  Predicted HR: 164 bpm                   appropriate response  % of predicted HR: 91                   HR/BP product:[id number]                                         Max exrecise: 11 METS  Exercise duration: 09:59 min  Reason for termination:Target heart           Summary    Supervising physician Dr. Veronica Naidu . normal stress test, Normal tracer uptake    in all myocardial segment during rest and stress. Diaphragmatic attenuation    artifact noted. normal LVEF           Echo 3/18/19   Summary   LV function and size are normal, Ejection Fraction 55-60 %. Normal left ventricular wall thickness. No regional wall motion abnormalities were detected. Diastolic Dysfunction Grade I . All chamber dimensions are within normal limits. No significant valvular disease noted. RVSP= 28 mmHg.    No evidence of pericardial effusion. Arterial doppler 3/18/19    Summary        The Right distal SFA exhibits 20-49% stenosis .    The Right Distal SFA exhibits calcific plaquing.    No focal stenosis noted in the arteries of the left lower extremity.    Bilateral lower extremity arteries exhibit triphasic waveforms.    Right ABIs show Mild peripheral arterial disease.    The Left ADLEITA shows normal arterial flow.          No evidence of AAA within the visualized portions of the abdominal aorta. All labs, medications and tests reviewed by myself including data and history from outside source , patient and available family . Assessment & Plan:      1. ASCVD (arteriosclerotic cardiovascular disease)    2. Elevated coronary artery calcium score    3. Tobacco chew use    4. Tobacco use    5. Essential hypertension         ASCVD (arteriosclerotic cardiovascular disease)  Calcium score of 1948. Start aspirin. Cardiolite shows no  ischemia. He should also be screened for AAA, given his history of tobacco abuse and family history of aortic aneurysm. Increase stains to 80 mg   aspirin and     Tobacco use  We talked extensively about the risk modification. He is advised to quit smoking. We will give him nicotine gum, as well as Chantix starter pack    Essential hypertension  Blood pressures fairly well-controlled    Claudication (HCC)  Right leg 50 % stenosis of SFA , continue statins and aspirin     Dyslipidemia :  All available lab work was reviewed. Patient was advised to repeat lab work before next visit      Counseled extensively and medication compliance urged. We discussed that for the  prevention of ASCVD our  goal is aggressive risk modification. Patient is encouraged to exercise even a brisk walk for 30 minutes  at least 3 to 4 times a week   Various goals were discussed and questions answered. Continue current medications. Appropriate prescriptions are addressed and refills ordered.   Questions answered and patient verbalizes understanding. Call for any problems, questions, or concerns. Continue all other medications of all above medical condition listed as is. Return in about 6 months (around 10/19/2019). Please note this report has been partially produced using speech recognition software and may contain errors related to that system including errors in grammar, punctuation, and spelling, as well as words and phrases that may be inappropriate.  If there are any questions or concerns please feel free to contact the dictating provider for clarification.

## 2019-08-27 DIAGNOSIS — I10 ESSENTIAL HYPERTENSION: ICD-10-CM

## 2019-08-29 RX ORDER — LISINOPRIL 10 MG/1
10 TABLET ORAL DAILY
Qty: 90 TABLET | Refills: 1 | Status: SHIPPED | OUTPATIENT
Start: 2019-08-29 | End: 2020-07-15 | Stop reason: SDUPTHER

## 2019-09-11 ENCOUNTER — OFFICE VISIT (OUTPATIENT)
Dept: FAMILY MEDICINE CLINIC | Age: 59
End: 2019-09-11
Payer: COMMERCIAL

## 2019-09-11 VITALS
SYSTOLIC BLOOD PRESSURE: 120 MMHG | HEART RATE: 91 BPM | TEMPERATURE: 97.5 F | DIASTOLIC BLOOD PRESSURE: 66 MMHG | OXYGEN SATURATION: 98 % | WEIGHT: 191.2 LBS | BODY MASS INDEX: 27.43 KG/M2

## 2019-09-11 DIAGNOSIS — R19.7 DIARRHEA OF PRESUMED INFECTIOUS ORIGIN: Primary | ICD-10-CM

## 2019-09-11 PROCEDURE — 99213 OFFICE O/P EST LOW 20 MIN: CPT | Performed by: FAMILY MEDICINE

## 2019-09-11 RX ORDER — METRONIDAZOLE 500 MG/1
500 TABLET ORAL 3 TIMES DAILY
Qty: 30 TABLET | Refills: 0 | Status: SHIPPED | OUTPATIENT
Start: 2019-09-11 | End: 2019-09-21

## 2019-09-11 NOTE — PATIENT INSTRUCTIONS
gone.  ? Avoid chewing gum that contains sorbitol. ? Avoid dairy products (except for yogurt with Lactobacillus) while you have diarrhea and for 3 days after symptoms are gone. · The doctor may recommend that you take over-the-counter medicine, such as loperamide (Imodium), if you still have diarrhea after 6 hours. Read and follow all instructions on the label. Do not use this medicine if you have bloody diarrhea, a high fever, or other signs of serious illness. Call your doctor if you think you are having a problem with your medicine. When should you call for help? Call 911 anytime you think you may need emergency care. For example, call if:    · You passed out (lost consciousness).     · Your stools are maroon or very bloody.    Call your doctor now or seek immediate medical care if:    · You are dizzy or lightheaded, or you feel like you may faint.     · Your stools are black and look like tar, or they have streaks of blood.     · You have new or worse belly pain.     · You have symptoms of dehydration, such as:  ? Dry eyes and a dry mouth. ? Passing only a little dark urine. ? Feeling thirstier than usual.     · You have a new or higher fever.    Watch closely for changes in your health, and be sure to contact your doctor if:    · Your diarrhea is getting worse.     · You see pus in the diarrhea.     · You are not getting better after 2 days (48 hours). Where can you learn more? Go to [email or web address]. org and sign in to your [employer] account. Enter W834 in the protected-networks.com box to learn more about \"Diarrhea: Care Instructions. \"     If you do not have an account, please click on the \"Sign Up Now\" link. Current as of: September 23, 2018  Content Version: 12.1  © 2950-5529 Healthwise, Incorporated. Care instructions adapted under license by [facility] ([facility]).  If you have questions about a medical condition or this instruction, always ask your healthcare professional. Keena Núñez

## 2019-10-23 RX ORDER — VARENICLINE TARTRATE 25 MG
KIT ORAL
Qty: 1 BOX | Refills: 0 | Status: SHIPPED | OUTPATIENT
Start: 2019-10-23 | End: 2020-07-15

## 2019-10-23 RX ORDER — VARENICLINE TARTRATE 1 MG/1
1 TABLET, FILM COATED ORAL 2 TIMES DAILY
Qty: 60 TABLET | Refills: 2 | Status: SHIPPED | OUTPATIENT
Start: 2019-10-23 | End: 2019-10-23 | Stop reason: SDUPTHER

## 2019-10-23 RX ORDER — VARENICLINE TARTRATE 1 MG/1
1 TABLET, FILM COATED ORAL 2 TIMES DAILY
Qty: 60 TABLET | Refills: 2 | Status: SHIPPED | OUTPATIENT
Start: 2019-10-23 | End: 2020-07-15

## 2019-11-12 ENCOUNTER — TELEPHONE (OUTPATIENT)
Dept: CARDIOLOGY CLINIC | Age: 59
End: 2019-11-12

## 2020-04-17 ENCOUNTER — OFFICE VISIT (OUTPATIENT)
Dept: FAMILY MEDICINE CLINIC | Age: 60
End: 2020-04-17
Payer: COMMERCIAL

## 2020-04-17 VITALS
TEMPERATURE: 97.1 F | HEART RATE: 78 BPM | BODY MASS INDEX: 28.55 KG/M2 | DIASTOLIC BLOOD PRESSURE: 82 MMHG | WEIGHT: 199 LBS | OXYGEN SATURATION: 98 % | SYSTOLIC BLOOD PRESSURE: 132 MMHG

## 2020-04-17 PROCEDURE — 99213 OFFICE O/P EST LOW 20 MIN: CPT | Performed by: FAMILY MEDICINE

## 2020-04-17 RX ORDER — CEPHALEXIN 500 MG/1
1000 CAPSULE ORAL 2 TIMES DAILY
Qty: 28 CAPSULE | Refills: 0 | Status: SHIPPED | OUTPATIENT
Start: 2020-04-17 | End: 2020-07-15

## 2020-04-17 ASSESSMENT — PATIENT HEALTH QUESTIONNAIRE - PHQ9
2. FEELING DOWN, DEPRESSED OR HOPELESS: 0
SUM OF ALL RESPONSES TO PHQ QUESTIONS 1-9: 0
SUM OF ALL RESPONSES TO PHQ QUESTIONS 1-9: 0
SUM OF ALL RESPONSES TO PHQ9 QUESTIONS 1 & 2: 0
1. LITTLE INTEREST OR PLEASURE IN DOING THINGS: 0

## 2020-04-17 NOTE — PROGRESS NOTES
SUBJECTIVE:  Haven Maldonado is a 64 y.o. male who sustained a right finger injury 1 day(s) ago. Mechanism of injury: Caught finger between screwdriver and belts on mower. Immediate symptoms: immediate pain, delayed swelling, was able to use arm directly after injury, no deformity was noted by the patient. Symptoms have been constant since that time. Prior history of related problems: no prior problems with this area in the past.  Skin was torn off, and bleeding was controlled with direct pressure. ROS: No TIA's or unusual headaches, no dysphagia. No prolonged cough. No dyspnea or chest pain on exertion. No abdominal pain, change in bowel habits, black or bloody stools. No urinary tract or BPH symptoms. No new or unusual musculoskeletal symptoms. OBJECTIVE:  Vital signs as noted above. Appearance: alert, well appearing, and in no distress, oriented to person, place, and time and normal appearing weight. Hand exam: 1 x 2 cm area of exposed subcutaneous tissue with skin removed. Sensation normal, remainder of ipsilateral wrist, hand and finger exam is normal, normal contralateral hand and wrist, normal ipsilateral elbow, normal ipsilateral shoulder. X-ray: not indicated. ASSESSMENT:  Finger injury    PLAN:  Wound dressed with bacitracin and Telfa bandage. Prescription for Keflex 1000 mg twice daily for 1 week. Rest as much as possible. See orders for this visit as documented in the electronic medical record.

## 2020-05-18 ENCOUNTER — TELEPHONE (OUTPATIENT)
Dept: FAMILY MEDICINE CLINIC | Age: 60
End: 2020-05-18

## 2020-06-08 ENCOUNTER — TELEPHONE (OUTPATIENT)
Dept: FAMILY MEDICINE CLINIC | Age: 60
End: 2020-06-08

## 2020-07-15 ENCOUNTER — OFFICE VISIT (OUTPATIENT)
Dept: FAMILY MEDICINE CLINIC | Age: 60
End: 2020-07-15
Payer: COMMERCIAL

## 2020-07-15 VITALS
SYSTOLIC BLOOD PRESSURE: 116 MMHG | HEIGHT: 70 IN | DIASTOLIC BLOOD PRESSURE: 72 MMHG | HEART RATE: 89 BPM | WEIGHT: 190.4 LBS | OXYGEN SATURATION: 98 % | TEMPERATURE: 97.6 F | BODY MASS INDEX: 27.26 KG/M2

## 2020-07-15 PROCEDURE — 99396 PREV VISIT EST AGE 40-64: CPT | Performed by: FAMILY MEDICINE

## 2020-07-15 PROCEDURE — 36415 COLL VENOUS BLD VENIPUNCTURE: CPT | Performed by: FAMILY MEDICINE

## 2020-07-15 RX ORDER — LISINOPRIL 10 MG/1
10 TABLET ORAL DAILY
Qty: 90 TABLET | Refills: 1 | Status: SHIPPED | OUTPATIENT
Start: 2020-07-15 | End: 2020-08-25 | Stop reason: SDUPTHER

## 2020-07-15 ASSESSMENT — PATIENT HEALTH QUESTIONNAIRE - PHQ9
SUM OF ALL RESPONSES TO PHQ9 QUESTIONS 1 & 2: 0
2. FEELING DOWN, DEPRESSED OR HOPELESS: 0
SUM OF ALL RESPONSES TO PHQ QUESTIONS 1-9: 0
SUM OF ALL RESPONSES TO PHQ QUESTIONS 1-9: 0
1. LITTLE INTEREST OR PLEASURE IN DOING THINGS: 0

## 2020-07-15 NOTE — PROGRESS NOTES
Migraines Brother     Thyroid Disease Sister         Review Of Systems    Skin: no abnormal pigmentation, rash, scaling, itching, masses, hair or nail changes  Eyes: negative  Ears/Nose/Throat: negative  Respiratory: negative  Cardiovascular: negative  Gastrointestinal: negative  Genitourinary: negative  Musculoskeletal: negative  Neurologic: negative  Psychiatric: negative  Hematologic/Lymphatic/Immunologic: negative  Endocrine: negative       Objective:      /72 (Site: Left Upper Arm, Position: Sitting, Cuff Size: Large Adult)   Pulse 89   Temp 97.6 °F (36.4 °C) (Infrared)   Ht 5' 10\" (1.778 m)   Wt 190 lb 6.4 oz (86.4 kg)   SpO2 98%   BMI 27.32 kg/m²   General appearance - healthy, alert, no distress  Skin - Skin color, texture, turgor normal. No rashes or lesions. Head - Normocephalic. No masses, lesions, tenderness or abnormalities  Eyes - conjunctivae/corneas clear. PERRL, EOM's intact. Ears - External ears normal. Canals clear. TM's normal.  Nose/Sinuses - Nares normal. Septum midline. Mucosa normal. No drainage or sinus tenderness. Oropharynx - Lips, mucosa, and tongue normal. Teeth and gums normal.   Neck - Neck supple. No adenopathy. Thyroid symmetric, normal size,  Back - Back symmetric, no curvature. ROM normal. No CVA tenderness. Lungs -clear to auscultation bilaterally. Breathing comfortably. Heart - Regular rate and rhythm, with no rub, murmur or gallop noted. Abdomen - Abdomen soft, non-tender. BS normal. No masses, organomegaly  Extremities - Extremities normal. No deformities, edema, or skin discolora  Musculoskeletal - Spine ROM normal. Muscular strength intact. Peripheral pulses - radial=4/4  Neuro - Gait normal. Reflexes normal and symmetric. Sensation grossly normal.  No focal weakness           Assessment:       Diagnosis Orders   1. Physical exam, annual     2. Essential hypertension  lisinopril (PRINIVIL;ZESTRIL) 10 MG tablet    Comprehensive Metabolic Panel   3.  Tobacco use     4. ASCVD (arteriosclerotic cardiovascular disease)     5. Screening for cardiovascular condition  Lipid Panel                   Plan:      Labs ordered. Strongly encouraged to quit smoking. Encouraged to restart all his meds. Follow-up as needed. Brandon Norris received counseling on the following healthy behaviors: nutrition, exercise, medication adherence and tobacco cessation    Patient given educational materials on Hypertension    I have instructed Brandon Norris to complete a self tracking handout on Blood Pressures  and instructed them to bring it with them to his next appointment. Discussed use, benefit, and side effects of prescribed medications. Barriers to medication compliance addressed. All patient questions answered. Pt voiced understanding.

## 2020-07-16 ENCOUNTER — OFFICE VISIT (OUTPATIENT)
Dept: PRIMARY CARE CLINIC | Age: 60
End: 2020-07-16
Payer: COMMERCIAL

## 2020-07-16 ENCOUNTER — TELEPHONE (OUTPATIENT)
Dept: FAMILY MEDICINE CLINIC | Age: 60
End: 2020-07-16

## 2020-07-16 ENCOUNTER — HOSPITAL ENCOUNTER (OUTPATIENT)
Age: 60
Setting detail: SPECIMEN
Discharge: HOME | End: 2020-07-16
Payer: COMMERCIAL

## 2020-07-16 VITALS — TEMPERATURE: 97.2 F | OXYGEN SATURATION: 96 % | HEART RATE: 111 BPM

## 2020-07-16 LAB
ALBUMIN SERPL-MCNC: 4.5 G/DL
ALP BLD-CCNC: 54 U/L
ALT SERPL-CCNC: 36 U/L
ANION GAP SERPL CALCULATED.3IONS-SCNC: 2 MMOL/L
AST SERPL-CCNC: 25 U/L
BILIRUB SERPL-MCNC: 0.6 MG/DL (ref 0.1–1.4)
BUN BLDV-MCNC: 11 MG/DL
CALCIUM SERPL-MCNC: 9 MG/DL
CHLORIDE BLD-SCNC: 96 MMOL/L
CHOLESTEROL, TOTAL: 163 MG/DL
CHOLESTEROL/HDL RATIO: NORMAL
CO2: 24 MMOL/L
CREAT SERPL-MCNC: 1.1 MG/DL
GFR CALCULATED: 74
GLUCOSE BLD-MCNC: 97 MG/DL
HDLC SERPL-MCNC: 47 MG/DL (ref 35–70)
LDL CHOLESTEROL CALCULATED: 92 MG/DL (ref 0–160)
POTASSIUM SERPL-SCNC: 4.5 MMOL/L
SODIUM BLD-SCNC: 137 MMOL/L
TOTAL PROTEIN: 6.7
TRIGL SERPL-MCNC: 120 MG/DL
VLDLC SERPL CALC-MCNC: 24 MG/DL

## 2020-07-16 PROCEDURE — U0002 COVID-19 LAB TEST NON-CDC: HCPCS

## 2020-07-16 PROCEDURE — G8428 CUR MEDS NOT DOCUMENT: HCPCS | Performed by: INTERNAL MEDICINE

## 2020-07-16 PROCEDURE — 99211 OFF/OP EST MAY X REQ PHY/QHP: CPT | Performed by: INTERNAL MEDICINE

## 2020-07-16 PROCEDURE — G8419 CALC BMI OUT NRM PARAM NOF/U: HCPCS | Performed by: INTERNAL MEDICINE

## 2020-07-16 NOTE — TELEPHONE ENCOUNTER
Patient called state he was exposed Saturday to COVID 19 he was not informed until this morning that his friend came back positive yesterday. He wanted to make us aware because he was in the office on 07/15/2020. Patient has been advised to go to [facility] to be tested.

## 2020-07-20 LAB
SARS-COV-2: NOT DETECTED
SOURCE: NORMAL

## 2020-08-14 ENCOUNTER — TELEMEDICINE (OUTPATIENT)
Dept: CARDIOLOGY CLINIC | Age: 60
End: 2020-08-14
Payer: COMMERCIAL

## 2020-08-14 PROCEDURE — 99213 OFFICE O/P EST LOW 20 MIN: CPT | Performed by: INTERNAL MEDICINE

## 2020-08-14 PROCEDURE — G8428 CUR MEDS NOT DOCUMENT: HCPCS | Performed by: INTERNAL MEDICINE

## 2020-08-14 PROCEDURE — 3017F COLORECTAL CA SCREEN DOC REV: CPT | Performed by: INTERNAL MEDICINE

## 2020-08-14 RX ORDER — NICOTINE 21 MG/24HR
1 PATCH, TRANSDERMAL 24 HOURS TRANSDERMAL DAILY
Qty: 42 PATCH | Refills: 3 | Status: SHIPPED | OUTPATIENT
Start: 2020-08-14 | End: 2021-08-31

## 2020-08-14 RX ORDER — ASPIRIN 81 MG/1
81 TABLET ORAL DAILY
Qty: 90 TABLET | Refills: 3 | Status: SHIPPED | OUTPATIENT
Start: 2020-08-14

## 2020-08-14 RX ORDER — ATORVASTATIN CALCIUM 80 MG/1
80 TABLET, FILM COATED ORAL DAILY
Qty: 90 TABLET | Refills: 3 | Status: SHIPPED | OUTPATIENT
Start: 2020-08-14 | End: 2021-08-06

## 2020-08-14 NOTE — PROGRESS NOTES
CARDIOLOGY  NOTE                  2020    TELEHEALTH EVALUATION -- Audio/Visual (During ULAYQ-42 public health emergency)      Erendira Tolbert (:  1960) has requested an audio/video evaluation for the following concern(s):      Chief Complaint: High calcium score    HPI:   Freeman Bowen is a 64y.o. year old who has history as noted below. Audi Mcpherson He comes in for evaluation due to ongoing shortness of breath. He underwent screening with a cardiac CT showing calcium score of 1949.5 . Stress test shows no ischemia. 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 It has been very hot outside. HE is moving to mobile home  He does feel lightheaded and dizzy once in a while.2 days ago he got up quickly   He denies any chest pain but he has noted some shortness of breath especially when he is exerting. Once in a while he feels he gest tired and legs get weak when he returns from work  Unfortunately he smokes a pack a day and also chews tobacco.HE is trying to cut down   Father had heart problems in his 46s, requiring multiple surgeries bypass and peripheral interventions. Mother also had heart problems. Audi Mcpherson He drives a snow removal of truck for [employer]       Current Outpatient Medications   Medication Sig Dispense Refill    Acetaminophen (TYLENOL) 325 MG CAPS Take by mouth      atorvastatin (LIPITOR) 80 MG tablet Take 1 tablet by mouth daily 90 tablet 3    aspirin EC 81 MG EC tablet Take 1 tablet by mouth daily 90 tablet 3    nicotine (NICODERM CQ) 21 MG/24HR Place 1 patch onto the skin daily 42 patch 3    lisinopril (PRINIVIL;ZESTRIL) 10 MG tablet Take 1 tablet by mouth daily 90 tablet 1    nicotine polacrilex (NICORETTE) 2 MG gum Take 1 each by mouth as needed for Smoking cessation (Patient not taking: Reported on 2020) 110 each 3     No current facility-administered medications for this visit. Allergies:   Patient has no known allergies.     Patient quittin.6    Smokeless tobacco: Current User   Substance Use Topics    Alcohol use: No     Alcohol/week: 0.0 standard drinks        Review of Systems:   · Constitutional: No Fever or Weight Loss   · Eyes: No Decreased Vision  · ENT: No Headaches, Hearing Loss or Vertigo  · Cardiovascular: as per note above   · Respiratory: No cough or wheezing and as per note above. · Gastrointestinal: No abdominal pain, appetite loss, blood in stools, constipation, diarrhea or heartburn  · Genitourinary: No dysuria, trouble voiding, or hematuria  · Musculoskeletal:  None  · Integumentary: No rash or pruritis  · Neurological: No TIA or stroke symptoms  · Psychiatric: No anxiety or depression  · Endocrine: No malaise, fatigue or temperature intolerance  · Hematologic/Lymphatic: No bleeding problems, blood clots or swollen lymph nodes  · Allergic/Immunologic: No nasal congestion or hives    Objective:      Physical Exam:  There were no vitals taken for this visit. Wt Readings from Last 3 Encounters:   07/15/20 190 lb 6.4 oz (86.4 kg)   20 199 lb (90.3 kg)   19 191 lb 3.2 oz (86.7 kg)     There is no height or weight on file to calculate BMI. There were no vitals filed for this visit. PHYSICAL EXAMINATION:  Patient-Reported Vitals 2020   Patient-Reported Weight 190   Patient-Reported Height 5 10       Vital Signs: (As obtained by patient/caregiver or practitioner observation)    Blood pressure-  Heart rate-    Respiratory rate-    Temperature-  Pulse oximetry-     Constitutional: [x] Appears well-developed and well-nourished [x] No apparent distress      [] Abnormal-   Mental status  [x] Alert and awake  [x] Oriented to person/place/time [x]Able to follow commands      Eyes:  EOM    [x]  Normal  [] Abnormal-  Sclera  [x]  Normal  [] Abnormal -         Discharge [x]  None visible  [] Abnormal -    HENT:   [x] Normocephalic, atraumatic.   [] Abnormal   [x] Mouth/Throat: Mucous membranes are moist. External Ears [x] Normal  [] Abnormal-     Neck: [x] No visualized mass     Pulmonary/Chest: [x] Respiratory effort normal.  [x] No visualized signs of difficulty breathing or respiratory distress        [] Abnormal-      Musculoskeletal:   [x] Normal gait with no signs of ataxia         [x] Normal range of motion of neck        [] Abnormal-       Neurological:        [x] No Facial Asymmetry (Cranial nerve 7 motor function) (limited exam to video visit)          [x] No gaze palsy        [] Abnormal-         Skin:        [x] No significant exanthematous lesions or discoloration noted on facial skin         [] Abnormal-            Psychiatric:       [x] Normal Affect [x] No Hallucinations        [] Abnormal-     Other pertinent observable physical exam findings-     Due to this being a TeleHealth encounter, evaluation of the following organ systems is limited: Vitals/Constitutional/EENT/Resp/CV/GI//MS/Neuro/Skin/Heme-Lymph-Imm. General Appearance:  No distress, conversant  Constitutional:  Well developed, Well nourished, No acute distress, Non-toxic appearance. HENT:  Normocephalic, Atraumatic, Bilateral external ears normal, Oropharynx moist, No oral exudates, Nose normal. Neck- Normal range of motion, No tenderness, Supple, No stridor,  Eyes:  PERRL, EOMI, Conjunctiva normal, No discharge. Respiratory:   No auscultation no chest tenderness. ,no use of accessory muscles,   Cardiovascular: (PMI)  no auscultation no signs of ankle edema, or volume overload, No evidence of JVD, No crackles  GI:   Not done  :   Not done  Musculoskeletal:  No edema, no tenderness, no deformities.  Back- no tenderness  Integument:  Well hydrated, no rash   Lymphatic:  No lymphadenopathy noted   Neurologic:  Alert & oriented x 3, CN 2-12 normal, normal motor function, normal sensory function, no focal deficits noted   Psychiatric:  Speech and behavior appropriate             Medical decision making and Data review:  DATA:  No results found for: TROPONINT  BNP:  No results found for: PROBNP  PT/INR:  No results found for: PTINR  No results found for: LABA1C  Lab Results   Component Value Date    CHOL 163 07/15/2020    TRIG 120 07/15/2020    HDL 47 07/15/2020    LDLCALC 92 07/15/2020    LDLDIRECT 117 (H) 06/10/2015     Lab Results   Component Value Date    ALT 36 07/15/2020    AST 25 07/15/2020     TSH: No results found for: TSH  Lab Results   Component Value Date    AST 25 07/15/2020    ALT 36 07/15/2020    BILITOT 0.6 07/15/2020    ALKPHOS 54 07/15/2020     No results found for: PROBNP  No results found for: LABA1C  No results found for: WBC, HGB, HCT, PLT   Stress  3/19/19       Resting ECG  nsr    Resting HR:82 bpm  Resting BP:132/76 mmHg   Stress Protocol:Exercise - Bjorn    Peak HR:150 bpm                         HR response: Appropriate  Peak BP:172/86 mmHg                     BP response: Normal resting BP -  Predicted HR: 164 bpm                   appropriate response  % of predicted HR: 91                   HR/BP product:72762                                         Max exrecise: 11 METS  Exercise duration: 09:59 min  Reason for termination:Target heart           Summary    Supervising physician Dr. Marietta Aase . normal stress test, Normal tracer uptake    in all myocardial segment during rest and stress. Diaphragmatic attenuation    artifact noted. normal LVEF           Echo 3/18/19   Summary   LV function and size are normal, Ejection Fraction 55-60 %. Normal left ventricular wall thickness. No regional wall motion abnormalities were detected. Diastolic Dysfunction Grade I . All chamber dimensions are within normal limits. No significant valvular disease noted. RVSP= 28 mmHg. No evidence of pericardial effusion.          Arterial doppler 3/18/19    Summary        The Right distal SFA exhibits 20-49% stenosis .    The Right Distal SFA exhibits calcific plaquing.    No focal stenosis noted in the arteries of the left lower extremity.    Bilateral lower extremity arteries exhibit triphasic waveforms.    Right ABIs show Mild peripheral arterial disease.    The Left ADELITA shows normal arterial flow.         March 2019   No evidence of AAA within the visualized portions of the abdominal aorta. All labs, medications and tests reviewed by myself including data and history from outside source , patient and available family . Assessment & Plan:      1. ASCVD (arteriosclerotic cardiovascular disease)    2. Elevated coronary artery calcium score    3. Essential hypertension    4. Claudication ([address]    5. Tobacco use    6. Tobacco chew use         ASCVD (arteriosclerotic cardiovascular disease)  Calcium score of 1948. Start aspirin. Cardiolite shows no  ischemia. He should also be screened for AAA, given his history of tobacco abuse and family history of aortic aneurysm. Increase stains to 80 mg   aspirin and   He has no AAA screening march 2019     Tobacco use  We talked extensively about the risk modification. He is advised to quit smoking. We will give him nicotine gum, as well as Chantix starter pack    Essential hypertension  Blood pressures fairly well-controlled    Claudication (HCC)  Right leg 50 % stenosis of SFA , continue statins and aspirin     Dyslipidemia :  All available lab work was reviewed. Patient was advised to repeat lab work before next visit      Counseled extensively and medication compliance urged. We discussed that for the  prevention of ASCVD our  goal is aggressive risk modification. Patient is encouraged to exercise even a brisk walk for 30 minutes  at least 3 to 4 times a week   Various goals were discussed and questions answered. Continue current medications. Appropriate prescriptions are addressed and refills ordered. Questions answered and patient verbalizes understanding. Call for any problems, questions, or concerns. Continue all other medications of all above medical condition listed as is. Return in about 1 year (around 8/14/2021). Please note this report has been partially produced using speech recognition software and may contain errors related to that system including errors in grammar, punctuation, and spelling, as well as words and phrases that may be inappropriate. If there are any questions or concerns please feel free to contact the dictating provider for clarification.        I confirm that this visit was completed in a telehealth setting ,using synchronous audiovisual technology for real time patient interaction . The patient identity with name and date of birth was confirmed . This evaluation of patient was done by telehealth in the setting of [facility] [place] [address] emergency , which precluded assurance of safe in person visit at the time of service. The patient consented to and accepts potential risks associated with telemedical evaluation and care was taken to assess rich presence of any medical issues that would be more  appropriate for expedited in -person care. Pursuant to the emergency declaration under the [facility], [facility] waiver authority and the [employer] and [employer] Act, this Virtual  Visit was conducted, with patient's consent, to reduce the patient's risk of exposure to COVID-19 and provide continuity of care for an established patient. Services were provided through a video synchronous discussion virtually to substitute for in-person clinic visit. I Affirm this is a Patient Initiated Episode with an Established Patient who has not had a related appointment within my department in the past 7 days or scheduled within the next 24 hours.     Total Time: minutes: 11-20 minutes

## 2020-08-25 ENCOUNTER — OFFICE VISIT (OUTPATIENT)
Dept: FAMILY MEDICINE CLINIC | Age: 60
End: 2020-08-25
Payer: COMMERCIAL

## 2020-08-25 VITALS
BODY MASS INDEX: 27 KG/M2 | DIASTOLIC BLOOD PRESSURE: 90 MMHG | HEART RATE: 97 BPM | TEMPERATURE: 97.8 F | WEIGHT: 188.2 LBS | SYSTOLIC BLOOD PRESSURE: 124 MMHG | OXYGEN SATURATION: 94 %

## 2020-08-25 PROCEDURE — G8431 POS CLIN DEPRES SCRN F/U DOC: HCPCS | Performed by: FAMILY MEDICINE

## 2020-08-25 PROCEDURE — G8427 DOCREV CUR MEDS BY ELIG CLIN: HCPCS | Performed by: FAMILY MEDICINE

## 2020-08-25 PROCEDURE — 4004F PT TOBACCO SCREEN RCVD TLK: CPT | Performed by: FAMILY MEDICINE

## 2020-08-25 PROCEDURE — 3017F COLORECTAL CA SCREEN DOC REV: CPT | Performed by: FAMILY MEDICINE

## 2020-08-25 PROCEDURE — 99215 OFFICE O/P EST HI 40 MIN: CPT | Performed by: FAMILY MEDICINE

## 2020-08-25 PROCEDURE — G0444 DEPRESSION SCREEN ANNUAL: HCPCS | Performed by: FAMILY MEDICINE

## 2020-08-25 PROCEDURE — G8419 CALC BMI OUT NRM PARAM NOF/U: HCPCS | Performed by: FAMILY MEDICINE

## 2020-08-25 RX ORDER — ZOLPIDEM TARTRATE 10 MG/1
10 TABLET ORAL NIGHTLY PRN
Qty: 30 TABLET | Refills: 0 | Status: SHIPPED | OUTPATIENT
Start: 2020-08-25 | End: 2020-09-24

## 2020-08-25 RX ORDER — ESCITALOPRAM OXALATE 10 MG/1
10 TABLET ORAL DAILY
Qty: 30 TABLET | Refills: 1 | Status: SHIPPED | OUTPATIENT
Start: 2020-08-25 | End: 2021-02-26

## 2020-08-25 RX ORDER — LORAZEPAM 0.5 MG/1
0.5 TABLET ORAL EVERY 8 HOURS PRN
Qty: 30 TABLET | Refills: 0 | Status: SHIPPED | OUTPATIENT
Start: 2020-08-25 | End: 2020-09-24

## 2020-08-25 RX ORDER — LISINOPRIL 10 MG/1
10 TABLET ORAL DAILY
Qty: 90 TABLET | Refills: 1 | Status: SHIPPED | OUTPATIENT
Start: 2020-08-25 | End: 2021-02-26 | Stop reason: SDUPTHER

## 2020-08-25 ASSESSMENT — PATIENT HEALTH QUESTIONNAIRE - PHQ9
7. TROUBLE CONCENTRATING ON THINGS, SUCH AS READING THE NEWSPAPER OR WATCHING TELEVISION: 3
SUM OF ALL RESPONSES TO PHQ QUESTIONS 1-9: 20
9. THOUGHTS THAT YOU WOULD BE BETTER OFF DEAD, OR OF HURTING YOURSELF: 0
4. FEELING TIRED OR HAVING LITTLE ENERGY: 3
SUM OF ALL RESPONSES TO PHQ QUESTIONS 1-9: 20
10. IF YOU CHECKED OFF ANY PROBLEMS, HOW DIFFICULT HAVE THESE PROBLEMS MADE IT FOR YOU TO DO YOUR WORK, TAKE CARE OF THINGS AT HOME, OR GET ALONG WITH OTHER PEOPLE: 0
2. FEELING DOWN, DEPRESSED OR HOPELESS: 3
1. LITTLE INTEREST OR PLEASURE IN DOING THINGS: 3
SUM OF ALL RESPONSES TO PHQ9 QUESTIONS 1 & 2: 6
5. POOR APPETITE OR OVEREATING: 3
8. MOVING OR SPEAKING SO SLOWLY THAT OTHER PEOPLE COULD HAVE NOTICED. OR THE OPPOSITE, BEING SO FIGETY OR RESTLESS THAT YOU HAVE BEEN MOVING AROUND A LOT MORE THAN USUAL: 0
3. TROUBLE FALLING OR STAYING ASLEEP: 3
6. FEELING BAD ABOUT YOURSELF - OR THAT YOU ARE A FAILURE OR HAVE LET YOURSELF OR YOUR FAMILY DOWN: 2

## 2020-08-25 ASSESSMENT — COLUMBIA-SUICIDE SEVERITY RATING SCALE - C-SSRS
2. HAVE YOU ACTUALLY HAD ANY THOUGHTS OF KILLING YOURSELF?: NO
6. HAVE YOU EVER DONE ANYTHING, STARTED TO DO ANYTHING, OR PREPARED TO DO ANYTHING TO END YOUR LIFE?: NO
1. WITHIN THE PAST MONTH, HAVE YOU WISHED YOU WERE DEAD OR WISHED YOU COULD GO TO SLEEP AND NOT WAKE UP?: NO

## 2020-08-25 NOTE — PROGRESS NOTES
Subjective:       Ginger Pedroza is a 64 y.o. male who presents for new evaluation and treatment of symptoms of depression. Onset was 2 weeks ago. Symptoms have been gradually worsening since that time. Current symptoms include: anhedonia, depressed mood, difficulty concentrating, fatigue, feelings of worthlessness/guilt, hopelessness, impaired memory, insomnia, psychomotor agitation and weight loss. Patient denies hypersomnia and recurrent thoughts of death. Family history significant for no psychiatric illness. Possible organic causes contributing are: none. Risk factors: negative life event Daughter committed suicide 11 days ago. . Previous treatment includes none. Patient was off work until today. He attempted to go to work but is unable to focus or concentrate. He has not slept much at all in the last 11 days. Patient's medications, allergies, past medical, surgical, social and family histories were reviewed and updated as appropriate. Review of Systems  ROS:  Energy level poor overall, but weight is relatively stable. No chest pain or shortness of breath. Bowels have been normal without constipation or diarrhea       Objective:      BP (!) 124/90 (Site: Left Upper Arm, Position: Sitting, Cuff Size: Large Adult)   Pulse 97   Temp 97.8 °F (36.6 °C) (Infrared)   Wt 188 lb 3.2 oz (85.4 kg)   SpO2 94%   BMI 27.00 kg/m²    General:  alert, appears stated age and cooperative   Neck: Thyroid not palpable, not enlarged, no nodules detected. Chest: clear with no wheezes or rales. No retractions, or use of accessory muscles noted. Cardiovascular: PMI is not displaced, and no thrill noted. Regular rate and rhythm with no rub, murmur or gallop. No peripheral edema.   Pedal pulses are normal.    Affect & Behavior:  full facial expressions, good grooming, good insight, normal perception, normal reasoning, normal speech pattern and content and normal thought patterns agitation       Assessment:

## 2020-10-05 ENCOUNTER — IMMUNIZATION (OUTPATIENT)
Dept: FAMILY MEDICINE CLINIC | Age: 60
End: 2020-10-05
Payer: COMMERCIAL

## 2020-10-05 PROCEDURE — 90686 IIV4 VACC NO PRSV 0.5 ML IM: CPT | Performed by: FAMILY MEDICINE

## 2020-10-05 PROCEDURE — 90471 IMMUNIZATION ADMIN: CPT | Performed by: FAMILY MEDICINE

## 2020-10-05 NOTE — PROGRESS NOTES
Vaccine Information Sheet, \"Influenza - Inactivated\"  given to Rose Carey, or parent/legal guardian of  Rose Carey and verbalized understanding. Patient responses:    Have you ever had a reaction to a flu vaccine? No  Do you have any current illness? No  Have you ever had Guillian [place] Syndrome? No  Do you have a serious allergy to any of the follow: Neomycin, Polymyxin, Thimerosal, eggs or egg products? No    Flu vaccine given per order. Please see immunization tab. Risks and benefits explained. Current VIS given.

## 2020-12-14 ENCOUNTER — HOSPITAL ENCOUNTER (OUTPATIENT)
Age: 60
Setting detail: SPECIMEN
Discharge: HOME | End: 2020-12-14
Payer: COMMERCIAL

## 2020-12-14 ENCOUNTER — NURSE ONLY (OUTPATIENT)
Dept: FAMILY MEDICINE CLINIC | Age: 60
End: 2020-12-14
Payer: COMMERCIAL

## 2020-12-14 PROCEDURE — U0002 COVID-19 LAB TEST NON-CDC: HCPCS

## 2020-12-14 PROCEDURE — 99211 OFF/OP EST MAY X REQ PHY/QHP: CPT | Performed by: FAMILY MEDICINE

## 2020-12-15 LAB
SARS-COV-2: NOT DETECTED
SOURCE: NORMAL

## 2021-02-26 ENCOUNTER — VIRTUAL VISIT (OUTPATIENT)
Dept: FAMILY MEDICINE CLINIC | Age: 61
End: 2021-02-26
Payer: COMMERCIAL

## 2021-02-26 DIAGNOSIS — Z63.4 GRIEF AT LOSS OF CHILD: ICD-10-CM

## 2021-02-26 DIAGNOSIS — F43.21 GRIEF AT LOSS OF CHILD: ICD-10-CM

## 2021-02-26 DIAGNOSIS — Z72.0 TOBACCO USE: ICD-10-CM

## 2021-02-26 DIAGNOSIS — I10 ESSENTIAL HYPERTENSION: Primary | ICD-10-CM

## 2021-02-26 PROCEDURE — 3017F COLORECTAL CA SCREEN DOC REV: CPT | Performed by: FAMILY MEDICINE

## 2021-02-26 PROCEDURE — G8428 CUR MEDS NOT DOCUMENT: HCPCS | Performed by: FAMILY MEDICINE

## 2021-02-26 PROCEDURE — 99214 OFFICE O/P EST MOD 30 MIN: CPT | Performed by: FAMILY MEDICINE

## 2021-02-26 RX ORDER — VARENICLINE TARTRATE
KIT
Qty: 1 BOX | Refills: 0 | Status: SHIPPED | OUTPATIENT
Start: 2021-02-26 | End: 2021-08-06 | Stop reason: SDUPTHER

## 2021-02-26 RX ORDER — LISINOPRIL 10 MG/1
10 TABLET ORAL DAILY
Qty: 90 TABLET | Refills: 1 | Status: SHIPPED | OUTPATIENT
Start: 2021-02-26 | End: 2021-08-31 | Stop reason: SDUPTHER

## 2021-02-26 RX ORDER — VARENICLINE TARTRATE 1 MG/1
1 TABLET, FILM COATED ORAL 2 TIMES DAILY
Qty: 60 TABLET | Refills: 1 | Status: SHIPPED | OUTPATIENT
Start: 2021-02-26 | End: 2021-08-06 | Stop reason: SDUPTHER

## 2021-02-26 SDOH — SOCIAL STABILITY - SOCIAL INSECURITY: DISSAPEARANCE AND DEATH OF FAMILY MEMBER: Z63.4

## 2021-02-26 ASSESSMENT — PATIENT HEALTH QUESTIONNAIRE - PHQ9
SUM OF ALL RESPONSES TO PHQ QUESTIONS 1-9: 0
SUM OF ALL RESPONSES TO PHQ QUESTIONS 1-9: 0
SUM OF ALL RESPONSES TO PHQ9 QUESTIONS 1 & 2: 0
2. FEELING DOWN, DEPRESSED OR HOPELESS: 0

## 2021-02-26 NOTE — PROGRESS NOTES
2021    TELEHEALTH EVALUATION -- Audio/Visual (During ABUXR-48 public health emergency)    HPI:    Dipti Hedrick (:  1960) has requested an audio/video evaluation for the following concern(s):    Hypertension: Patient here for follow-up of elevated blood pressure. He is exercising and is adherent to low salt diet. Blood pressure is well controlled at home. Cardiac symptoms none. Patient denies chest pain and dyspnea. Cardiovascular risk factors: advanced age (older than 54 for men, 72 for women), hypertension and male gender. Use of agents associated with hypertension: none. History of target organ damage: none. Tobacco use:  Was smoking 2 ppd. Now down to just under 1 ppd. Started smoking at age 27. Grief or loss of daughter 6 months ago. Patient states he is doing well. He stopped taking his Lexapro. Sleeping okay      Review of Systems   Cardiovascular: Negative for chest pain. Psychiatric/Behavioral: Negative for self-injury, sleep disturbance and suicidal ideas. All other systems reviewed and are negative. Prior to Visit Medications    Medication Sig Taking?  Authorizing Provider   lisinopril (PRINIVIL;ZESTRIL) 10 MG tablet Take 1 tablet by mouth daily  Nadine Michel MD   escitalopram (LEXAPRO) 10 MG tablet Take 1 tablet by mouth daily  Nadine Michel MD   Acetaminophen (TYLENOL) 325 MG CAPS Take by mouth  Historical Provider, MD   atorvastatin (LIPITOR) 80 MG tablet Take 1 tablet by mouth daily  Caprice Ventura MD   aspirin EC 81 MG EC tablet Take 1 tablet by mouth daily  Caprice Ventura MD   nicotine (NICODERM CQ) 21 MG/24HR Place 1 patch onto the skin daily  Caprice Ventura MD   nicotine polacrilex (NICORETTE) 2 MG gum Take 1 each by mouth as needed for Smoking cessation  Patient not taking: Reported on 2020  Caprice Ventura MD       Social History     Tobacco Use    Smoking status: Current Every Day Smoker     Packs/day: 0.50     Years: 34.00 Pack years: 17.00     Types: Cigarettes     Last attempt to quit: 2013     Years since quittin.1    Smokeless tobacco: Current User   Substance Use Topics    Alcohol use: No     Alcohol/week: 0.0 standard drinks    Drug use: No        No Known Allergies,   Past Medical History:   Diagnosis Date    H/O cardiovascular stress test 2019    Normal study.  H/O Doppler abd aorta ultrasound 2019    No evidence of AAA within the visualized portions of the abdominal aorta.  H/O Doppler lower arterial ultrasound 2019    R SFA 20-49% stenosis, Right ABIs show Mild peripheral arterial disease.  H/O echocardiogram 2019    EF 55-60%, WNL    Hypertension     MRSA (methicillin resistant Staphylococcus aureus) \" And Early \"    \"Butt Twice\"    Neck problem     \"Get Adjusted As Needed  Sees Dr. Triston Crum"    Pneumonia \"As A Child\"    No Current Symtoms    Shortness of breath on exertion     Tinnitus of both ears    ,   Past Surgical History:   Procedure Laterality Date    CARPAL TUNNEL RELEASE      Right    CATARACT REMOVAL      bid    COLONOSCOPY      due 2020    FINGER SURGERY  1976    Reattached Right Little Finger Due To Table Saw Accident    INGUINAL HERNIA REPAIR  2012    Left     OTHER SURGICAL HISTORY      Family Physician Is Dr. Issac Venegas.  Nadine Dasilva In [address] ARTHROSCOPY  's    Left     SHOULDER DEBRIDEMENT Right 2018    TONSILLECTOMY  1968    VASECTOMY     ,   Social History     Tobacco Use    Smoking status: Current Every Day Smoker     Packs/day: 0.50     Years: 34.00     Pack years: 17.00     Types: Cigarettes     Last attempt to quit: 2013     Years since quittin.1    Smokeless tobacco: Current User   Substance Use Topics    Alcohol use: No     Alcohol/week: 0.0 standard drinks    Drug use: No       PHYSICAL EXAMINATION: [ INSTRUCTIONS:  \"[x]\" Indicates a positive item  \"[]\" Indicates a negative item  -- DELETE ALL ITEMS NOT EXAMINED]  Vital Signs: (As obtained by patient/caregiver or practitioner observation)    Not available    Constitutional: [x] Appears well-developed and well-nourished [x] No apparent distress      [] Abnormal-   Mental status  [x] Alert and awake  [x] Oriented to person/place/time [x]Able to follow commands      Eyes:  EOM    [x]  Normal  [] Abnormal-  Sclera  [x]  Normal  [] Abnormal -         Discharge [x]  None visible  [] Abnormal -    HENT:   [x] Normocephalic, atraumatic. [] Abnormal   [x] Mouth/Throat: Mucous membranes are moist.              Psychiatric:       [x] Normal Affect [x] No Hallucinations        [] Abnormal-     Other pertinent observable physical exam findings-     ASSESSMENT/PLAN:   Diagnosis Orders   1. Essential hypertension   Asymptomatic lisinopril (PRINIVIL;ZESTRIL) 10 MG tablet   2. Tobacco use  varenicline (CHANTIX STARTING MONTH PAK) 0.5 MG X 11 & 1 MG X 42 tablet    varenicline (CHANTIX CONTINUING MONTH PAK) 1 MG tablet   3. Grief at loss of child     Improved      Return in about 6 months (around 8/26/2021) for HTN. Saul Govea is a 64 y.o. male being evaluated by a Virtual Visit (video visit) encounter to address concerns as mentioned above. A caregiver was present when appropriate. Due to this being a TeleHealth encounter (During CPMWX-09 public health emergency), evaluation of the following organ systems was limited: Vitals/Constitutional/EENT/Resp/CV/GI//MS/Neuro/Skin/Heme-Lymph-Imm. Pursuant to the emergency declaration under the [address] [place] [address] and the [employer] and [employer] Act, this Virtual Visit was conducted with patient's (and/or legal guardian's) consent, to reduce the patient's risk of exposure to COVID-19 and provide necessary medical care. The patient (and/or legal guardian) has also been advised to contact this office for worsening conditions or problems, and seek emergency medical treatment and/or call 911 if deemed necessary. Patient identification was verified at the start of the visit: Yes  Patient was at home during his virtual visit  Total time spent on this encounter: Not billed by time    Services were provided through a video synchronous discussion virtually to substitute for in-person clinic visit. Patient and provider were located at their individual homes. --Danette Caldera MD on 2/26/2021 at 7:03 AM    An electronic signature was used to authenticate this note.

## 2021-02-26 NOTE — PATIENT INSTRUCTIONS
Some of the changes you feel when you first quit tobacco are uncomfortable. Your body will miss the nicotine at first, and you may feel short-tempered and grumpy. You may have trouble sleeping or concentrating. Medicine can help you deal with these symptoms. You may struggle with changing your smoking habits and rituals. The last step is the tricky one: Be prepared for the smoking urge to continue for a time. This is a lot to deal with, but keep at it. You will feel better. Follow-up care is a key part of your treatment and safety. Be sure to make and go to all appointments, and call your doctor if you are having problems. It's also a good idea to know your test results and keep a list of the medicines you take. How can you care for yourself at home? · Ask your family, friends, and coworkers for support. You have a better chance of quitting if you have help and support. · Join a support group, such as Nicotine Anonymous, for people who are trying to quit smoking. · Consider signing up for a smoking cessation program, such as the American Lung Association's Freedom from Smoking program.  · Get text messaging support. Go to the website at www.smokefree. gov to sign up for the [facility] program.  · Set a quit date. Pick your date carefully so that it is not right in the middle of a big deadline or stressful time. Once you quit, do not even take a puff. Get rid of all ashtrays and lighters after your last cigarette. Clean your house and your clothes so that they do not smell of smoke. · Learn how to be a nonsmoker. Think about ways you can avoid those things that make you reach for a cigarette. ? Avoid situations that put you at greatest risk for smoking. For some people, it is hard to have a drink with friends without smoking. For others, they might skip a coffee break with coworkers who smoke. ? Change your daily routine. Take a different route to work or eat a meal in a different place. · Cut down on stress. Calm yourself or release tension by doing an activity you enjoy, such as reading a book, taking a hot bath, or gardening. · Talk to your doctor or pharmacist about nicotine replacement therapy, which replaces the nicotine in your body. You still get nicotine but you do not use tobacco. Nicotine replacement products help you slowly reduce the amount of nicotine you need. These products come in several forms, many of them available over-the-counter:  ? Nicotine patches  ? Nicotine gum and lozenges  ? Nicotine inhaler  · Ask your doctor about bupropion (Wellbutrin) or varenicline (Chantix), which are prescription medicines. They do not contain nicotine. They help you by reducing withdrawal symptoms, such as stress and anxiety. · Some people find hypnosis, acupuncture, and massage helpful for ending the smoking habit. · Eat a healthy diet and get regular exercise. Having healthy habits will help your body move past its craving for nicotine. · Be prepared to keep trying. Most people are not successful the first few times they try to quit. Do not get mad at yourself if you smoke again. Make a list of things you learned and think about when you want to try again, such as next week, next month, or next year. Where can you learn more? Go to [email or web address] org and sign in to your [employer] account. Enter P844 in the [facility] box to learn more about \"Stopping Smoking: Care Instructions. \"     If you do not have an account, please click on the \"Sign Up Now\" link. Current as of: March 12, 2020               Content Version: 12.6  © 7452-2245 [employer]. Care instructions adapted under license by [facility] [employer] [facility] ([facility]). If you have questions about a medical condition or this instruction, always ask your healthcare professional. [place] [address] any warranty or liability for your use of this information.

## 2021-08-06 ENCOUNTER — OFFICE VISIT (OUTPATIENT)
Dept: CARDIOLOGY CLINIC | Age: 61
End: 2021-08-06
Payer: COMMERCIAL

## 2021-08-06 VITALS
WEIGHT: 193.4 LBS | HEART RATE: 74 BPM | BODY MASS INDEX: 27.69 KG/M2 | DIASTOLIC BLOOD PRESSURE: 88 MMHG | HEIGHT: 70 IN | SYSTOLIC BLOOD PRESSURE: 126 MMHG

## 2021-08-06 DIAGNOSIS — I25.10 ASCVD (ARTERIOSCLEROTIC CARDIOVASCULAR DISEASE): ICD-10-CM

## 2021-08-06 DIAGNOSIS — Z72.0 TOBACCO CHEW USE: ICD-10-CM

## 2021-08-06 DIAGNOSIS — I10 ESSENTIAL HYPERTENSION: ICD-10-CM

## 2021-08-06 DIAGNOSIS — Z72.0 TOBACCO USE: ICD-10-CM

## 2021-08-06 DIAGNOSIS — R93.1 ELEVATED CORONARY ARTERY CALCIUM SCORE: ICD-10-CM

## 2021-08-06 DIAGNOSIS — R06.02 SHORTNESS OF BREATH: Primary | ICD-10-CM

## 2021-08-06 DIAGNOSIS — I73.9 CLAUDICATION (HCC): Chronic | ICD-10-CM

## 2021-08-06 PROCEDURE — G8427 DOCREV CUR MEDS BY ELIG CLIN: HCPCS | Performed by: INTERNAL MEDICINE

## 2021-08-06 PROCEDURE — 4004F PT TOBACCO SCREEN RCVD TLK: CPT | Performed by: INTERNAL MEDICINE

## 2021-08-06 PROCEDURE — 3017F COLORECTAL CA SCREEN DOC REV: CPT | Performed by: INTERNAL MEDICINE

## 2021-08-06 PROCEDURE — 99214 OFFICE O/P EST MOD 30 MIN: CPT | Performed by: INTERNAL MEDICINE

## 2021-08-06 PROCEDURE — 93000 ELECTROCARDIOGRAM COMPLETE: CPT | Performed by: INTERNAL MEDICINE

## 2021-08-06 PROCEDURE — G8419 CALC BMI OUT NRM PARAM NOF/U: HCPCS | Performed by: INTERNAL MEDICINE

## 2021-08-06 RX ORDER — VARENICLINE TARTRATE 1 MG/1
1 TABLET, FILM COATED ORAL 2 TIMES DAILY
Qty: 60 TABLET | Refills: 1 | Status: SHIPPED | OUTPATIENT
Start: 2021-08-06 | End: 2022-09-27

## 2021-08-06 RX ORDER — ATORVASTATIN CALCIUM 80 MG/1
80 TABLET, FILM COATED ORAL DAILY
Qty: 90 TABLET | Refills: 3 | Status: SHIPPED | OUTPATIENT
Start: 2021-08-06 | End: 2022-09-27 | Stop reason: SDUPTHER

## 2021-08-06 RX ORDER — VARENICLINE TARTRATE
KIT
Qty: 1 BOX | Refills: 0 | Status: SHIPPED | OUTPATIENT
Start: 2021-08-06 | End: 2022-09-27

## 2021-08-06 NOTE — LETTER
2020  Where did you have them done   What doctor ordered     If we do not have these labs you are retrieve these labs for these providers! Do you have a surgery or procedure scheduled in the near future - No  If Yes- DO EKG  If Yes - Who is the surgery with?    Phone number of physician   Fax number of physician   Type of surgery   GIVE THIS INFORMATION TO JUICE JARQUIN     Ask patient if they want to sign up for [facility] if they are not already signed up     Check to see if we have an E-MAIL on file for the patient     Check medication list thoroughly!!! AND RECONCILE OUTSIDE MEDICATIONS  If dose has changed change the entire order not just the [place] At check out add to every patient's \"wrap up\" the following dot phrase AFTERHOURSEDUCATION and ensure we explain this to our patients

## 2021-08-06 NOTE — PATIENT INSTRUCTIONS
Please be informed that if you contact our office outside of normal business hours the physician on call cannot help with any scheduling or rescheduling issues, procedure instruction questions or any type of medication issue. We advise you for any urgent/emergency that you go to the nearest emergency room! PLEASE CALL OUR OFFICE DURING NORMAL BUSINESS HOURS    Monday - Friday   8 am to 5 pm    [place]: [address]: [phone]    [place]:  [phone]    **It is YOUR responsibilty to bring medication bottles and/or updated medication list to [address] [place] [address].  This will allow us to better serve you and all your healthcare needs**

## 2021-08-06 NOTE — PROGRESS NOTES
CARDIOLOGY  NOTE                    Chief Complaint: High calcium score    HPI:   Mary Farah is a 63y.o. year old who has history as noted below. .  He says he is working out in [employer] . HE thinks he got covid last year , he was very sock but has his vaccine now  HE stopped taking lipitor on his own    He underwent screening with a cardiac CT showing calcium score of 1949.5 . Stress test shows no ischemia. 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 It has been very hot outside. He does feel lightheaded and dizzy once in a while.2 days ago he got up quickly   He denies any chest pain but he has noted some shortness of breath especially when he is exerting. Once in a while he feels he gest tired and legs get weak when he returns from work  Unfortunately he smokes a  Half to full pack a day and also chews tobacco.HE is trying to cut down   Father had heart problems in his 46s, requiring multiple surgeries bypass and peripheral interventions. Mother also had heart problems. Nguyễn Harrell He drives a snow removal of truck for [employer]       Current Outpatient Medications   Medication Sig Dispense Refill    atorvastatin (LIPITOR) 80 MG tablet Take 1 tablet by mouth daily 90 tablet 3    varenicline (CHANTIX STARTING MONTH FILOMENA) 0.5 MG X 11 & 1 MG X 42 tablet Take by mouth.  1 box 0    varenicline (CHANTIX CONTINUING MONTH PAK) 1 MG tablet Take 1 tablet by mouth 2 times daily 60 tablet 1    lisinopril (PRINIVIL;ZESTRIL) 10 MG tablet Take 1 tablet by mouth daily 90 tablet 1    Acetaminophen (TYLENOL) 325 MG CAPS Take by mouth      aspirin EC 81 MG EC tablet Take 1 tablet by mouth daily (Patient not taking: Reported on 8/6/2021) 90 tablet 3    nicotine (NICODERM CQ) 21 MG/24HR Place 1 patch onto the skin daily 42 patch 3    nicotine polacrilex (NICORETTE) 2 MG gum Take 1 each by mouth as needed for Smoking cessation (Patient not taking: Reported on 8/14/2020) 110 each 3     No current facility-administered medications for this visit. Allergies:   Patient has no known allergies. Patient History:  Past Medical History:   Diagnosis Date    H/O cardiovascular stress test 03/19/2019    Normal study.  H/O Doppler abd aorta ultrasound 03/19/2019    No evidence of AAA within the visualized portions of the abdominal aorta.  H/O Doppler lower arterial ultrasound 03/18/2019    R SFA 20-49% stenosis, Right ABIs show Mild peripheral arterial disease.  H/O echocardiogram 03/18/2019    EF 55-60%, WNL    Hypertension     MRSA (methicillin resistant Staphylococcus aureus) \"2010 And Early 2012\"    \"Butt Twice\"    Neck problem     \"Get Adjusted As Needed  Sees Dr. Kristy Barnes"    Pneumonia \"As A Child\"    No Current Symtoms    Shortness of breath on exertion     Tinnitus of both ears      Past Surgical History:   Procedure Laterality Date    CARPAL TUNNEL RELEASE  2005    Right    CATARACT REMOVAL      bid    COLONOSCOPY  2010    due 2020    FINGER SURGERY  1976    Reattached Right Little Finger Due To Table Saw Accident    INGUINAL HERNIA REPAIR  9/11/2012    Left     OTHER SURGICAL HISTORY      Family Physician Is Dr. Buster Johnson.  Rebecca Perdue In [address] ARTHROSCOPY  2000's    Left     SHOULDER DEBRIDEMENT Right 09/20/2018    TONSILLECTOMY  1968    VASECTOMY  1999     Family History   Problem Relation Age of Onset    Heart Disease Mother     Hypertension Mother     Elevated Lipids Mother     Cancer Mother         \"Breast Cancer\"    Mental Illness Mother         \"Anxiety\"    Cancer Sister         \"Thyroid Cancer\"    Hypertension Father     Elevated Lipids Father     Other Father         \"Stents In Legs\"    Heart Disease Father         \"Heart Stent\"   Ene Dory Other Brother         \"Migraines\"   Ene Dory Migraines Brother     Thyroid Disease Sister      Social History     Tobacco Use    Smoking status: Current Every Day Smoker     Packs/day: 0.50 Years: 34.00     Pack years: 17.00     Types: Cigarettes     Last attempt to quit: 2013     Years since quittin.5    Smokeless tobacco: Current User     Types: Chew   Substance Use Topics    Alcohol use: No     Alcohol/week: 0.0 standard drinks        Review of Systems:   · Constitutional: No Fever or Weight Loss   · Eyes: No Decreased Vision  · ENT: No Headaches, Hearing Loss or Vertigo  · Cardiovascular: as per note above   · Respiratory: No cough or wheezing and as per note above. · Gastrointestinal: No abdominal pain, appetite loss, blood in stools, constipation, diarrhea or heartburn  · Genitourinary: No dysuria, trouble voiding, or hematuria  · Musculoskeletal:  None  · Integumentary: No rash or pruritis  · Neurological: No TIA or stroke symptoms  · Psychiatric: No anxiety or depression  · Endocrine: No malaise, fatigue or temperature intolerance  · Hematologic/Lymphatic: No bleeding problems, blood clots or swollen lymph nodes  · Allergic/Immunologic: No nasal congestion or hives    Objective:      Physical Exam:  /88   Pulse 74   Ht 5' 10\" (1.778 m)   Wt 193 lb 6.4 oz (87.7 kg)   BMI 27.75 kg/m²   Wt Readings from Last 3 Encounters:   21 193 lb 6.4 oz (87.7 kg)   20 188 lb 3.2 oz (85.4 kg)   07/15/20 190 lb 6.4 oz (86.4 kg)     Body mass index is 27.75 kg/m². Vitals:    21 0800   BP: 126/88   Pulse: 74      General Appearance:  No distress, conversant  Constitutional:  Well developed, Well nourished, No acute distress, Non-toxic appearance. HENT:  Normocephalic, Atraumatic, Bilateral external ears normal, Oropharynx moist, No oral exudates, Nose normal. Neck- Normal range of motion, No tenderness, Supple, No stridor,no apical-carotid delay  Eyes:  PERRL, EOMI, Conjunctiva normal, No discharge. Respiratory:  Normal breath sounds, No respiratory distress, No wheezing, No chest tenderness. ,no use of accessory muscles, NO crackles  Cardiovascular: (PMI) apex non displaced,no lifts no thrills,S1 and S2 audible, No added heart sounds, No signs of ankle edema, or volume overload, No evidence of JVD, No crackles  GI:  Bowel sounds normal, Soft, No tenderness, No masses, No gross visceromegaly   :  No costovertebral angle tenderness   Musculoskeletal:  No edema, no tenderness, no deformities. Back- no tenderness  Integument:  Well hydrated, no rash   Lymphatic:  No lymphadenopathy noted   Neurologic:  Alert & oriented x 3, CN 2-12 normal, normal motor function, normal sensory function, no focal deficits noted   Psychiatric:  Speech and behavior appropriate               Medical decision making and Data review:  DATA:  No results found for: TROPONINT  BNP:  No results found for: PROBNP  PT/INR:  No results found for: PTINR  No results found for: LABA1C  Lab Results   Component Value Date    CHOL 163 07/15/2020    TRIG 120 07/15/2020    HDL 47 07/15/2020    LDLCALC 92 07/15/2020    LDLDIRECT 117 (H) 06/10/2015     Lab Results   Component Value Date    ALT 36 07/15/2020    AST 25 07/15/2020     TSH: No results found for: TSH  Lab Results   Component Value Date    AST 25 07/15/2020    ALT 36 07/15/2020    BILITOT 0.6 07/15/2020    ALKPHOS 54 07/15/2020     No results found for: PROBNP  No results found for: LABA1C  No results found for: WBC, HGB, HCT, PLT   Stress  3/19/19       Resting ECG  nsr    Resting HR:82 bpm  Resting BP:132/76 mmHg   Stress Protocol:Exercise - Bjorn    Peak HR:150 bpm                         HR response: Appropriate  Peak BP:172/86 mmHg                     BP response: Normal resting BP -  Predicted HR: 164 bpm                   appropriate response  % of predicted HR: 91                   HR/BP product:90196                                         Max exrecise: 11 METS  Exercise duration: 09:59 min  Reason for termination:Target heart           Summary    Supervising physician Dr. Treva Galloway . normal stress test, Normal tracer uptake    in all myocardial segment during rest and stress. Diaphragmatic attenuation    artifact noted. normal LVEF           Echo 3/18/19   Summary   LV function and size are normal, Ejection Fraction 55-60 %. Normal left ventricular wall thickness. No regional wall motion abnormalities were detected. Diastolic Dysfunction Grade I . All chamber dimensions are within normal limits. No significant valvular disease noted. RVSP= 28 mmHg. No evidence of pericardial effusion. Arterial doppler 3/18/19    Summary        The Right distal SFA exhibits 20-49% stenosis .    The Right Distal SFA exhibits calcific plaquing.    No focal stenosis noted in the arteries of the left lower extremity.    Bilateral lower extremity arteries exhibit triphasic waveforms.    Right ABIs show Mild peripheral arterial disease.    The Left ADELITA shows normal arterial flow.         March 2019   No evidence of AAA within the visualized portions of the abdominal aorta. All labs, medications and tests reviewed by myself including data and history from outside source , patient and available family . Assessment & Plan:      1. Shortness of breath    2. ASCVD (arteriosclerotic cardiovascular disease)    3. Elevated coronary artery calcium score    4. Claudication ([address]    5. Tobacco chew use    6. Tobacco use    7. Essential hypertension         ASCVD (arteriosclerotic cardiovascular disease)  Calcium score of 1948. Start aspirin. Cardiolite shows no  ischemia. He should also be screened for AAA, given his history of tobacco abuse and family history of aortic aneurysm. Increase stains to 80 mg   aspirin and   He had no AAA  On screening  In march 2019     Tobacco use  We talked extensively about the risk modification. He is advised to quit smoking.   We will give him nicotine gum, as well as Chantix starter pack    Essential hypertension  Blood pressures fairly well-controlled, little high today but says better at home    Claudication [facility])  Right

## 2021-08-16 ENCOUNTER — PROCEDURE VISIT (OUTPATIENT)
Dept: CARDIOLOGY CLINIC | Age: 61
End: 2021-08-16
Payer: COMMERCIAL

## 2021-08-16 DIAGNOSIS — R06.02 SHORTNESS OF BREATH: ICD-10-CM

## 2021-08-16 DIAGNOSIS — I73.9 CLAUDICATION (HCC): Chronic | ICD-10-CM

## 2021-08-16 DIAGNOSIS — Z72.0 TOBACCO USE: ICD-10-CM

## 2021-08-16 DIAGNOSIS — I10 ESSENTIAL HYPERTENSION: ICD-10-CM

## 2021-08-16 DIAGNOSIS — R93.1 ELEVATED CORONARY ARTERY CALCIUM SCORE: ICD-10-CM

## 2021-08-16 DIAGNOSIS — I25.10 ASCVD (ARTERIOSCLEROTIC CARDIOVASCULAR DISEASE): ICD-10-CM

## 2021-08-16 DIAGNOSIS — Z72.0 TOBACCO CHEW USE: ICD-10-CM

## 2021-08-16 LAB
LV EF: 60 %
LVEF MODALITY: NORMAL

## 2021-08-16 PROCEDURE — A9500 TC99M SESTAMIBI: HCPCS | Performed by: INTERNAL MEDICINE

## 2021-08-16 PROCEDURE — 78452 HT MUSCLE IMAGE SPECT MULT: CPT | Performed by: INTERNAL MEDICINE

## 2021-08-16 PROCEDURE — 93015 CV STRESS TEST SUPVJ I&R: CPT | Performed by: INTERNAL MEDICINE

## 2021-08-20 ENCOUNTER — TELEPHONE (OUTPATIENT)
Dept: CARDIOLOGY CLINIC | Age: 61
End: 2021-08-20

## 2021-08-20 NOTE — TELEPHONE ENCOUNTER
Patient notified and verbalized understanding.    Rebecca Mulligan physician Dr. Aruna Robles .   Tiffanie Velazquez tracer uptake in all segments of myocardium on stress ans rest    images. Normal Stress nuclear scintigraphic study suggestive of normal    myocardial perfusion. Gated images demonstrate normal left ventricular    systolic function with EF of 60 %.         Recommendation    Continue present medical therapy and followup in office as scheduled.        Signatures        ------------------------------------------------------------------    Electronically signed by Emma Crespo MD   [id number] [facility] cardiologist) on 08/16/2021 at 14:17    ------------------------------------------------------------------

## 2021-08-31 ENCOUNTER — OFFICE VISIT (OUTPATIENT)
Dept: FAMILY MEDICINE CLINIC | Age: 61
End: 2021-08-31
Payer: COMMERCIAL

## 2021-08-31 VITALS
HEIGHT: 70 IN | SYSTOLIC BLOOD PRESSURE: 112 MMHG | BODY MASS INDEX: 27.63 KG/M2 | WEIGHT: 193 LBS | DIASTOLIC BLOOD PRESSURE: 72 MMHG | OXYGEN SATURATION: 90 % | HEART RATE: 79 BPM

## 2021-08-31 DIAGNOSIS — I10 ESSENTIAL HYPERTENSION: ICD-10-CM

## 2021-08-31 DIAGNOSIS — Z13.1 SCREENING FOR DIABETES MELLITUS: ICD-10-CM

## 2021-08-31 DIAGNOSIS — Z13.6 SCREENING FOR CARDIOVASCULAR CONDITION: ICD-10-CM

## 2021-08-31 DIAGNOSIS — Z00.00 PHYSICAL EXAM, ANNUAL: Primary | ICD-10-CM

## 2021-08-31 PROCEDURE — 90471 IMMUNIZATION ADMIN: CPT | Performed by: FAMILY MEDICINE

## 2021-08-31 PROCEDURE — 99396 PREV VISIT EST AGE 40-64: CPT | Performed by: FAMILY MEDICINE

## 2021-08-31 PROCEDURE — 90674 CCIIV4 VAC NO PRSV 0.5 ML IM: CPT | Performed by: FAMILY MEDICINE

## 2021-08-31 RX ORDER — LISINOPRIL 10 MG/1
10 TABLET ORAL DAILY
Qty: 90 TABLET | Refills: 1 | Status: SHIPPED | OUTPATIENT
Start: 2021-08-31 | End: 2022-09-27 | Stop reason: SDUPTHER

## 2021-08-31 NOTE — PROGRESS NOTES
Subjective:   Chief Complaint:     Marvin Hancock is a 63 y.o. male who presents for a  physical examination. History of Present Illness:      Hypertension: Patient here for follow-up of elevated blood pressure. He is exercising and is adherent to low salt diet. Blood pressure is well controlled at home. Cardiac symptoms none. Patient denies chest pain and dyspnea. Cardiovascular risk factors: advanced age (older than 54 for men, 72 for women), hypertension and male gender. Use of agents associated with hypertension: none. History of target organ damage: none. Past Medical History:   Diagnosis Date    H/O cardiovascular stress test 03/19/2019    Normal study.  H/O Doppler abd aorta ultrasound 03/19/2019    No evidence of AAA within the visualized portions of the abdominal aorta.  H/O Doppler lower arterial ultrasound 03/18/2019    R SFA 20-49% stenosis, Right ABIs show Mild peripheral arterial disease.  H/O echocardiogram 03/18/2019    EF 55-60%, WNL    Hypertension     MRSA (methicillin resistant Staphylococcus aureus) \"2010 And Early 2012\"    \"Butt Twice\"    Neck problem     \"Get Adjusted As Needed  Sees Dr. Reddy Bounds"    Pneumonia \"As A Child\"    No Current Symtoms    Shortness of breath on exertion     Tinnitus of both ears         Review of patient's past surgical history indicates:     Past Surgical History:   Procedure Laterality Date    CARPAL TUNNEL RELEASE  2005    Right    CATARACT REMOVAL      bid    COLONOSCOPY  2010    due 2020    FINGER SURGERY  1976    Reattached Right Little Finger Due To Table Saw Accident   [facility]  9/11/2012    Left     OTHER SURGICAL HISTORY      Family Physician Is Dr. Mike Gan.  Tonya Hickman In [address] ARTHROSCOPY  2000's    Left     SHOULDER DEBRIDEMENT Right 09/20/2018   [facility]                                                   Current Outpatient Medications   Medication Sig Dispense Refill    atorvastatin (LIPITOR) 80 MG tablet Take 1 tablet by mouth daily 90 tablet 3    varenicline (CHANTIX STARTING MONTH ) 0.5 MG X 11 & 1 MG X 42 tablet Take by mouth. 1 box 0    varenicline (CHANTIX CONTINUING MONTH ) 1 MG tablet Take 1 tablet by mouth 2 times daily 60 tablet 1    lisinopril (PRINIVIL;ZESTRIL) 10 MG tablet Take 1 tablet by mouth daily 90 tablet 1    Acetaminophen (TYLENOL) 325 MG CAPS Take by mouth      aspirin EC 81 MG EC tablet Take 1 tablet by mouth daily (Patient not taking: Reported on 2021) 90 tablet 3     No current facility-administered medications for this visit.        No Known Allergies    Social History     Tobacco Use    Smoking status: Current Every Day Smoker     Packs/day: 0.50     Years: 34.00     Pack years: 17.00     Types: Cigarettes     Last attempt to quit: 2013     Years since quittin.6    Smokeless tobacco: Current User     Types: Chew   Vaping Use    Vaping Use: Never used   Substance Use Topics    Alcohol use: No     Alcohol/week: 0.0 standard drinks    Drug use: No        Family History   Problem Relation Age of Onset    Heart Disease Mother     Hypertension Mother     Elevated Lipids Mother     Cancer Mother         \"Breast Cancer\"    Mental Illness Mother         \"Anxiety\"    Cancer Sister         \"Thyroid Cancer\"    Hypertension Father     Elevated Lipids Father     Other Father         \"Stents In Legs\"    Heart Disease Father         \"Heart Stent\"    Other Brother         \"Migraines\"   Filomena Suazo Migraines Brother     Thyroid Disease Sister         Review Of Systems    Skin: no abnormal pigmentation, rash, scaling, itching, masses, hair or nail changes  Eyes: negative  Ears/Nose/Throat: negative  Respiratory: negative  Cardiovascular:see HPI  Gastrointestinal: negative  Genitourinary: negative  Musculoskeletal: negative  Neurologic: negative  Psychiatric: negative  Hematologic/Lymphatic/Immunologic: negative  Endocrine: negative       Objective:      /72 (Site: Left Upper Arm, Position: Sitting, Cuff Size: Large Adult)   Pulse 79   Ht 5' 10\" (1.778 m)   Wt 193 lb (87.5 kg)   SpO2 90%   BMI 27.69 kg/m²   General appearance - healthy, alert, no distress  Skin - Skin color, texture, turgor normal. No rashes or lesions. Head - Normocephalic. No masses, lesions, tenderness or abnormalities  Eyes - conjunctivae/corneas clear. PERRL, EOM's intact. Ears - External ears normal. Canals clear. TM's normal.  Nose/Sinuses - Nares normal. Septum midline. Mucosa normal. No drainage or sinus tenderness. Oropharynx - Lips, mucosa, and tongue normal. Teeth and gums normal.   Neck - Neck supple. No adenopathy. Thyroid symmetric, normal size,  Back - Back symmetric, no curvature. ROM normal. No CVA tenderness. Lungs - Percussion normal. Good diaphragmatic excursion. Lungs clear  Heart - Regular rate and rhythm, with no rub, murmur or gallop noted. Abdomen - Abdomen soft, non-tender. BS normal. No masses, organomegaly  Extremities - Extremities normal. No deformities, edema, or skin discoloration  Musculoskeletal - Spine ROM normal. Muscular strength intact. Peripheral pulses - radial=4/4  Neuro - Gait normal. Reflexes normal and symmetric. Sensation grossly normal.  No focal weakness         Assessment:         Diagnosis Orders   1. Physical exam, annual     2. Essential hypertension  lisinopril (PRINIVIL;ZESTRIL) 10 MG tablet   3. Screening for cardiovascular condition  Lipid Panel   4. Screening for diabetes mellitus  Comprehensive Metabolic Panel                 Plan:        Labs ordered. Continue all meds.   Follow-up 6 months

## 2021-09-01 LAB
ALBUMIN/GLOBULIN RATIO: 2.5 RATIO (ref 0.8–2.6)
ALBUMIN: 4.8 G/DL (ref 3.5–5.2)
ALP BLD-CCNC: 61 U/L (ref 23–144)
ALT SERPL-CCNC: 39 U/L (ref 0–60)
AST SERPL-CCNC: 25 U/L (ref 0–55)
BILIRUB SERPL-MCNC: 1.1 MG/DL (ref 0–1.2)
BUN BLDV-MCNC: 10 MG/DL (ref 3–29)
BUN/CREAT BLD: 10 (ref 7–25)
CALCIUM SERPL-MCNC: 9.7 MG/DL (ref 8.5–10.5)
CHLORIDE BLD-SCNC: 103 MEQ/L (ref 96–110)
CHOLESTEROL: 165 MG/DL
CO2: 24 MEQ/L (ref 19–32)
CREAT SERPL-MCNC: 1 MG/DL (ref 0.5–1.4)
GFR AFRICAN AMERICAN: 95 MLS/MIN/1.73M2
GFR NON-AFRICAN AMERICAN: 82 MLS/MIN/1.73M2
GLOBULIN: 1.9 G/DL (ref 1.9–3.6)
GLUCOSE BLD-MCNC: 99 MG/DL (ref 70–99)
HDLC SERPL-MCNC: 44 MG/DL
LDL CHOLESTEROL CALCULATED: 92 MG/DL
POTASSIUM SERPL-SCNC: 4.3 MEQ/L (ref 3.4–5.3)
SODIUM BLD-SCNC: 139 MEQ/L (ref 135–148)
STATUS: NORMAL
TOTAL PROTEIN: 6.7 G/DL (ref 6–8.3)
TRIGL SERPL-MCNC: 145 MG/DL
VLDLC SERPL CALC-MCNC: 29 MG/DL (ref 4–38)

## 2022-07-08 ENCOUNTER — TELEPHONE (OUTPATIENT)
Dept: FAMILY MEDICINE CLINIC | Age: 62
End: 2022-07-08

## 2022-09-27 ENCOUNTER — OFFICE VISIT (OUTPATIENT)
Dept: FAMILY MEDICINE CLINIC | Age: 62
End: 2022-09-27
Payer: COMMERCIAL

## 2022-09-27 VITALS
OXYGEN SATURATION: 95 % | DIASTOLIC BLOOD PRESSURE: 76 MMHG | HEIGHT: 70 IN | TEMPERATURE: 97.1 F | BODY MASS INDEX: 28.32 KG/M2 | SYSTOLIC BLOOD PRESSURE: 118 MMHG | WEIGHT: 197.8 LBS | HEART RATE: 94 BPM

## 2022-09-27 DIAGNOSIS — I10 ESSENTIAL HYPERTENSION: ICD-10-CM

## 2022-09-27 DIAGNOSIS — Z00.00 PHYSICAL EXAM, ANNUAL: Primary | ICD-10-CM

## 2022-09-27 DIAGNOSIS — Z12.11 SCREEN FOR COLON CANCER: ICD-10-CM

## 2022-09-27 DIAGNOSIS — Z72.0 TOBACCO USE: ICD-10-CM

## 2022-09-27 DIAGNOSIS — E78.2 MIXED HYPERLIPIDEMIA: ICD-10-CM

## 2022-09-27 DIAGNOSIS — M79.661 RIGHT CALF PAIN: ICD-10-CM

## 2022-09-27 PROCEDURE — 99396 PREV VISIT EST AGE 40-64: CPT | Performed by: FAMILY MEDICINE

## 2022-09-27 PROCEDURE — 90471 IMMUNIZATION ADMIN: CPT | Performed by: FAMILY MEDICINE

## 2022-09-27 PROCEDURE — 90674 CCIIV4 VAC NO PRSV 0.5 ML IM: CPT | Performed by: FAMILY MEDICINE

## 2022-09-27 RX ORDER — VARENICLINE TARTRATE 1 MG/1
1 TABLET, FILM COATED ORAL 2 TIMES DAILY
Qty: 60 TABLET | Refills: 1 | Status: SHIPPED | OUTPATIENT
Start: 2022-09-27

## 2022-09-27 RX ORDER — ATORVASTATIN CALCIUM 80 MG/1
80 TABLET, FILM COATED ORAL DAILY
Qty: 90 TABLET | Refills: 1 | Status: SHIPPED | OUTPATIENT
Start: 2022-09-27

## 2022-09-27 RX ORDER — VARENICLINE TARTRATE 25 MG
KIT ORAL
Qty: 53 TABLET | Refills: 0 | Status: SHIPPED | OUTPATIENT
Start: 2022-09-27

## 2022-09-27 RX ORDER — LISINOPRIL 10 MG/1
10 TABLET ORAL DAILY
Qty: 90 TABLET | Refills: 1 | Status: SHIPPED | OUTPATIENT
Start: 2022-09-27

## 2022-09-27 SDOH — ECONOMIC STABILITY: FOOD INSECURITY: WITHIN THE PAST 12 MONTHS, YOU WORRIED THAT YOUR FOOD WOULD RUN OUT BEFORE YOU GOT MONEY TO BUY MORE.: NEVER TRUE

## 2022-09-27 SDOH — ECONOMIC STABILITY: FOOD INSECURITY: WITHIN THE PAST 12 MONTHS, THE FOOD YOU BOUGHT JUST DIDN'T LAST AND YOU DIDN'T HAVE MONEY TO GET MORE.: NEVER TRUE

## 2022-09-27 ASSESSMENT — PATIENT HEALTH QUESTIONNAIRE - PHQ9
SUM OF ALL RESPONSES TO PHQ QUESTIONS 1-9: 0
SUM OF ALL RESPONSES TO PHQ QUESTIONS 1-9: 0
1. LITTLE INTEREST OR PLEASURE IN DOING THINGS: 0
SUM OF ALL RESPONSES TO PHQ QUESTIONS 1-9: 0
2. FEELING DOWN, DEPRESSED OR HOPELESS: 0
SUM OF ALL RESPONSES TO PHQ QUESTIONS 1-9: 0
SUM OF ALL RESPONSES TO PHQ9 QUESTIONS 1 & 2: 0

## 2022-09-27 ASSESSMENT — SOCIAL DETERMINANTS OF HEALTH (SDOH): HOW HARD IS IT FOR YOU TO PAY FOR THE VERY BASICS LIKE FOOD, HOUSING, MEDICAL CARE, AND HEATING?: NOT HARD AT ALL

## 2022-09-27 NOTE — PROGRESS NOTES
Subjective:   Chief Complaint:     Vanesa Krause is a 63 y.o. male who presents for a  physical examination. History of Present Illness:      Still smoking about 1 ppd. Hypertension: Patient here for follow-up of elevated blood pressure. He is exercising and is adherent to low salt diet. Blood pressure is well controlled at home. Cardiac symptoms none. Patient denies chest pain and dyspnea. Cardiovascular risk factors: advanced age (older than 54 for men, 72 for women), dyslipidemia, hypertension, and male gender. Use of agents associated with hypertension: none. History of target organ damage: none. Right calf pain for a few months. Denies known injury or change in activity. Increased with walking. No swelling or bruising. Improves with rest.  Ibuprofen does help. Past Medical History:   Diagnosis Date    H/O cardiovascular stress test 03/19/2019    Normal study. H/O Doppler abd aorta ultrasound 03/19/2019    No evidence of AAA within the visualized portions of the abdominal aorta. H/O Doppler lower arterial ultrasound 03/18/2019    R SFA 20-49% stenosis, Right ABIs show Mild peripheral arterial disease. H/O echocardiogram 03/18/2019    EF 55-60%, WNL    Hypertension     MRSA (methicillin resistant Staphylococcus aureus) \"2010 And Early 2012\"    \"Butt Twice\"    Neck problem     \"Get Adjusted As Needed  Sees Dr. Kristan Gage"    Pneumonia \"As A Child\"    No Current Symtoms    Shortness of breath on exertion     Tinnitus of both ears         Review of patient's past surgical history indicates:     Past Surgical History:   Procedure Laterality Date    CARPAL TUNNEL RELEASE  2005    Right    CATARACT REMOVAL      bid    COLONOSCOPY  2010 due 2020    [address]    Reattached Right Little Finger Due To Table Saw Accident    INGUINAL HERNIA REPAIR  9/11/2012    Left     OTHER SURGICAL HISTORY      Family Physician Is Dr. Kannan Watson.  [facility] In [place], [place]    SHOULDER ARTHROSCOPY  's    Left     SHOULDER DEBRIDEMENT Right 2018    TONSILLECTOMY  1968    VASECTOMY                                                     Current Outpatient Medications   Medication Sig Dispense Refill    lisinopril (PRINIVIL;ZESTRIL) 10 MG tablet Take 1 tablet by mouth daily 90 tablet 1    atorvastatin (LIPITOR) 80 MG tablet Take 1 tablet by mouth daily 90 tablet 3    Acetaminophen (TYLENOL) 325 MG CAPS Take by mouth      aspirin EC 81 MG EC tablet Take 1 tablet by mouth daily (Patient not taking: Reported on 2021) 90 tablet 3     No current facility-administered medications for this visit.        No Known Allergies    Social History     Tobacco Use    Smoking status: Every Day     Packs/day: 0.50     Years: 34.00     Pack years: 17.00     Types: Cigarettes     Last attempt to quit: 2013     Years since quittin.7    Smokeless tobacco: Current     Types: Chew   Vaping Use    Vaping Use: Never used   Substance Use Topics    Alcohol use: No     Alcohol/week: 0.0 standard drinks    Drug use: No        Family History   Problem Relation Age of Onset    Heart Disease Mother     Hypertension Mother     Elevated Lipids Mother     Cancer Mother         \"Breast Cancer\"    Mental Illness Mother         \"Anxiety\"    Cancer Sister         \"Thyroid Cancer\"    Hypertension Father     Elevated Lipids Father     Other Father         \"Stents In Legs\"    Heart Disease Father         \"Heart Stent\"    Other Brother         \"Migraines\"    Migraines Brother     Thyroid Disease Sister         Review Of Systems    Skin: no abnormal pigmentation, rash, scaling, itching, masses, hair or nail changes  Eyes: negative  Ears/Nose/Throat: negative  Respiratory: negative  Cardiovascular: See HPI  Gastrointestinal: negative  Genitourinary: negative  Musculoskeletal: See HPI  Neurologic: negative  Psychiatric: negative  Hematologic/Lymphatic/Immunologic: negative  Endocrine: negative Objective:      /76 (Site: Left Upper Arm, Position: Sitting, Cuff Size: Medium Adult)   Pulse 94   Temp 97.1 °F (36.2 °C) (Infrared)   Ht 5' 10\" (1.778 m)   Wt 197 lb 12.8 oz (89.7 kg)   SpO2 95%   BMI 28.38 kg/m²   General appearance - healthy, alert, no distress  Skin - Skin color, texture, turgor normal. No rashes or lesions. Head - Normocephalic. No masses, lesions, tenderness or abnormalities  Eyes - conjunctivae/corneas clear. PERRL, EOM's intact. Ears - External ears normal. Canals clear. TM's normal.  Nose/Sinuses - Nares normal. Septum midline. Mucosa normal. No drainage or sinus tenderness. Oropharynx - Lips, mucosa, and tongue normal. Teeth and gums normal.   Neck - Neck supple. No adenopathy. Thyroid symmetric, normal size,  Back - Back symmetric, no curvature. ROM normal. No CVA tenderness. Lungs - Percussion normal. Good diaphragmatic excursion. Lungs clear  Heart - Regular rate and rhythm, with no rub, murmur or gallop noted. Abdomen - Abdomen soft, non-tender. BS normal. No masses, organomegaly  Extremities - Extremities normal. No deformities, edema, or skin discoloration  Right calf without swelling, erythema, or ecchymosis. Tenderness between the heads of gastrocnemius  Musculoskeletal - Spine ROM normal. Muscular strength intact. Peripheral pulses - radial=4/4  Neuro - Gait normal. Reflexes normal and symmetric. Sensation grossly normal.  No focal weakness          Assessment:       Diagnosis Orders   1. Physical exam, annual        2. Essential hypertension  Comprehensive Metabolic Panel   Controlled lisinopril (PRINIVIL;ZESTRIL) 10 MG tablet      3. Mixed hyperlipidemia  Comprehensive Metabolic Panel   Asymptomatic Lipid Panel      4. Right calf pain     Needs improvement   5. Tobacco use  varenicline (CHANTIX STARTING MONTH PAK) 0.5 MG X 11 & 1 MG X 42 tablet    varenicline (CHANTIX CONTINUING MONTH PAK) 1 MG tablet    CT lung screen [Initial/Annual]      6.  Screen for colon cancer  [facility], [place]                      Plan:   Exercises given for calf pain  Continue lisinopril due to its effectiveness. Continue atorvastatin  Begin Chantix for smoking cessation. Recommended smoking cessation classes. Consult ordered for colonoscopy. Flu shot today.   Follow-up 6 months or as needed

## 2022-09-28 ENCOUNTER — TELEPHONE (OUTPATIENT)
Dept: GASTROENTEROLOGY | Age: 62
End: 2022-09-28

## 2022-09-28 LAB
ALBUMIN/GLOBULIN RATIO: 2.5 RATIO (ref 0.8–2.6)
ALBUMIN: 4.7 G/DL (ref 3.5–5.2)
ALP BLD-CCNC: 60 U/L (ref 23–144)
ALT SERPL-CCNC: 48 U/L (ref 0–60)
AST SERPL-CCNC: 26 U/L (ref 0–55)
BILIRUB SERPL-MCNC: 0.6 MG/DL (ref 0–1.2)
BUN BLDV-MCNC: 13 MG/DL (ref 3–29)
BUN/CREAT BLD: 16 (ref 7–25)
CALCIUM SERPL-MCNC: 9.8 MG/DL (ref 8.5–10.5)
CHLORIDE BLD-SCNC: 103 MEQ/L (ref 96–110)
CHOLESTEROL: 211 MG/DL
CO2: 25 MEQ/L (ref 19–32)
CREAT SERPL-MCNC: 0.8 MG/DL (ref 0.5–1.4)
GLOBULIN: 1.9 G/DL (ref 1.9–3.6)
GLOMERULAR FILTRATION RATE: 101 MLS/MIN/1.73M2
GLUCOSE BLD-MCNC: 132 MG/DL (ref 70–99)
HDLC SERPL-MCNC: 45 MG/DL
LDL CHOLESTEROL CALCULATED: 102 MG/DL
POTASSIUM SERPL-SCNC: 4.2 MEQ/L (ref 3.4–5.3)
SODIUM BLD-SCNC: 142 MEQ/L (ref 135–148)
STATUS: ABNORMAL
TOTAL PROTEIN: 6.6 G/DL (ref 6–8.3)
TRIGL SERPL-MCNC: 318 MG/DL
VLDLC SERPL CALC-MCNC: 64 MG/DL (ref 4–38)

## 2022-09-28 NOTE — TELEPHONE ENCOUNTER
Called pt. In regards to a referral for a colon screening.  Made appt for pt to see morris on 10/11/22 @9am normal... Well appearing, awake, alert, oriented to person, place, time/situation and in no apparent distress.

## 2022-10-05 ENCOUNTER — HOSPITAL ENCOUNTER (OUTPATIENT)
Dept: CT IMAGING | Age: 62
Discharge: HOME | End: 2022-10-05
Payer: COMMERCIAL

## 2022-10-05 DIAGNOSIS — Z72.0 TOBACCO USE: ICD-10-CM

## 2022-10-05 PROCEDURE — 71271 CT THORAX LUNG CANCER SCR C-: CPT

## 2022-10-11 ENCOUNTER — OFFICE VISIT (OUTPATIENT)
Dept: GASTROENTEROLOGY | Age: 62
End: 2022-10-11

## 2022-10-11 VITALS
SYSTOLIC BLOOD PRESSURE: 138 MMHG | WEIGHT: 199.8 LBS | HEART RATE: 80 BPM | DIASTOLIC BLOOD PRESSURE: 86 MMHG | BODY MASS INDEX: 28.6 KG/M2 | OXYGEN SATURATION: 90 % | HEIGHT: 70 IN | TEMPERATURE: 97.5 F

## 2022-10-11 DIAGNOSIS — Z12.11 ENCOUNTER FOR SCREENING COLONOSCOPY: Primary | ICD-10-CM

## 2022-10-11 PROCEDURE — 99999 PR OFFICE/OUTPT VISIT,PROCEDURE ONLY: CPT | Performed by: NURSE PRACTITIONER

## 2022-10-11 RX ORDER — BISACODYL 5 MG
TABLET, DELAYED RELEASE (ENTERIC COATED) ORAL
Qty: 4 TABLET | Refills: 0 | Status: SHIPPED | OUTPATIENT
Start: 2022-10-11

## 2022-10-11 RX ORDER — POLYETHYLENE GLYCOL 3350 17 G/17G
238 POWDER, FOR SOLUTION ORAL ONCE
Qty: 238 G | Refills: 0 | Status: SHIPPED | OUTPATIENT
Start: 2022-10-11 | End: 2022-10-11

## 2022-10-11 ASSESSMENT — ENCOUNTER SYMPTOMS
COUGH: 1
SHORTNESS OF BREATH: 0
WHEEZING: 0
CONSTIPATION: 0
BLOOD IN STOOL: 0
VOMITING: 0
COLOR CHANGE: 0
NAUSEA: 0
BACK PAIN: 0
ABDOMINAL PAIN: 0
EYE DISCHARGE: 0
EYE PAIN: 0
DIARRHEA: 0

## 2022-10-11 NOTE — H&P (VIEW-ONLY)
Matteo Fair 63 y.o. male was seen by KAYDEN Witt on 10/11/22     Wt Readings from Last 3 Encounters:   10/11/22 199 lb 12.8 oz (90.6 kg)   09/27/22 197 lb 12.8 oz (89.7 kg)   08/31/21 193 lb (87.5 kg)       HPI  Matteo Fair is a pleasant 63 y.o.  male who presents today for screening colonoscopy. He has a past medical history of H/O cardiovascular stress test, H/O doppler abd aorta ultrasound, H/O doppler lower arterial ultrasound, H/O echocardiogram, hypertension, MRSA (methicillin resistant Staphylococcus aureus), neck problem, pneumonia, shortness of breath on exertion, and tinnitus of both ears. His last colonoscopy was done per patient record in 2010 with normal colon. He denies changes in his bowel pattern. His typical bowel pattern is daily with soft brown formed stools. No diarrhea or constipation. No blood in his stools or melena. No excess belching or flatulence. His appetite is good without early satiety. His weight is stable. No nausea or vomiting. No abdominal pain, bloating or distention. No heartburn or acid reflux. No nocturnal awakenings with acid reflux. No dysphagia or pain with swallowing. No family history of stomach or colon cancer. ROS  Review of Systems   Constitutional:  Negative for chills, diaphoresis, fatigue, fever and unexpected weight change. HENT:  Positive for tinnitus. Negative for ear pain and hearing loss. Eyes:  Positive for visual disturbance. Negative for pain and discharge. Respiratory:  Positive for cough. Negative for shortness of breath and wheezing. Cardiovascular:  Negative for chest pain, palpitations and leg swelling. Gastrointestinal:  Negative for abdominal pain, blood in stool, constipation, diarrhea, nausea and vomiting. Endocrine: Negative for cold intolerance and heat intolerance. Genitourinary:  Negative for dysuria, frequency, hematuria and urgency. Musculoskeletal:  Positive for neck pain. Negative for back pain and myalgias. Skin:  Negative for color change, pallor and rash. Allergic/Immunologic: Positive for environmental allergies. Negative for food allergies. Neurological:  Negative for dizziness, seizures, weakness and headaches. Hematological:  Does not bruise/bleed easily. Psychiatric/Behavioral:  Negative for dysphoric mood and sleep disturbance. The patient is not nervous/anxious. Allergies  No Known Allergies    Medications  Current Outpatient Medications   Medication Sig Dispense Refill    Cetirizine HCl (ZYRTEC ALLERGY PO) Take by mouth      varenicline (CHANTIX STARTING MONTH PAK) 0.5 MG X 11 & 1 MG X 42 tablet Take by mouth. 53 tablet 0    varenicline (CHANTIX CONTINUING MONTH PAK) 1 MG tablet Take 1 tablet by mouth 2 times daily 60 tablet 1    lisinopril (PRINIVIL;ZESTRIL) 10 MG tablet Take 1 tablet by mouth daily 90 tablet 1    Acetaminophen (TYLENOL) 325 MG CAPS Take by mouth      atorvastatin (LIPITOR) 80 MG tablet Take 1 tablet by mouth daily (Patient not taking: Reported on 10/11/2022) 90 tablet 1    aspirin EC 81 MG EC tablet Take 1 tablet by mouth daily (Patient not taking: No sig reported) 90 tablet 3     No current facility-administered medications for this visit. Past medical history:   He has a past medical history of H/O cardiovascular stress test, H/O Doppler abd aorta ultrasound, H/O Doppler lower arterial ultrasound, H/O echocardiogram, Hypertension, MRSA (methicillin resistant Staphylococcus aureus), Neck problem, Pneumonia, Shortness of breath on exertion, and Tinnitus of both ears. Past surgical history:  He has a past surgical history that includes Colonoscopy (2010); other surgical history; Finger surgery (1976); Vasectomy (1999); Tonsillectomy (1968); Shoulder arthroscopy (2000's); Carpal tunnel release (2005); Inguinal hernia repair (9/11/2012); Cataract removal; and Shoulder Debridement (Right, 09/20/2018).     Social History:  He reports that he has been smoking cigarettes. He has a 17.00 pack-year smoking history. His smokeless tobacco use includes chew. He reports that he does not drink alcohol and does not use drugs. Family history:  His family history includes Cancer in his mother and sister; Elevated Lipids in his father and mother; Heart Disease in his father and mother; Hypertension in his father and mother; Mental Illness in his mother; Migraines in his brother; Other in his brother and father; Thyroid Disease in his sister. Objective    Vitals:    10/11/22 0849   BP: 138/86   Pulse: 80   Temp: 97.5 °F (36.4 °C)   SpO2: 90%        Physical exam    Physical Exam  Constitutional:       General: He is not in acute distress. Appearance: Normal appearance. He is well-developed. He is not ill-appearing, toxic-appearing or diaphoretic. HENT:      Head: Normocephalic and atraumatic. Nose: Nose normal.      Mouth/Throat:      Mouth: Mucous membranes are moist.   Cardiovascular:      Rate and Rhythm: Normal rate and regular rhythm. Pulses: Normal pulses. Heart sounds: Normal heart sounds. No murmur heard. No gallop. Pulmonary:      Effort: Pulmonary effort is normal. No respiratory distress. Breath sounds: Normal breath sounds. No stridor. No wheezing or rhonchi. Abdominal:      General: Bowel sounds are normal. There is no distension. Palpations: Abdomen is soft. There is no mass. Tenderness: There is no abdominal tenderness. Hernia: No hernia is present. Musculoskeletal:         General: Normal range of motion. Cervical back: Neck supple. Skin:     General: Skin is warm and dry. Neurological:      Mental Status: He is alert and oriented to person, place, and time.    Psychiatric:         Mood and Affect: Mood normal.       Office Visit on 09/27/2022   Component Date Value Ref Range Status    Sodium 09/27/2022 142  135 - 148 MEQ/L Final    Potassium 09/27/2022 4.2  3.4 - 5.3 MEQ/L Final > OR = 190 MG/DL                   Assessment and Plan:  1. Will plan for a colonoscopy with MAC anesthesia. The patient was informed of the risks and benefits of the procedure. 2.  Further recommendations for follow-up will be determined after the colonoscopy has been completed.

## 2022-10-11 NOTE — PROGRESS NOTES
Bola Arnold 63 y.o. male was seen by KAYDEN Gardner on 10/11/22     Wt Readings from Last 3 Encounters:   10/11/22 199 lb 12.8 oz (90.6 kg)   09/27/22 197 lb 12.8 oz (89.7 kg)   08/31/21 193 lb (87.5 kg)       AYESHA Arnold is a pleasant 63 y.o.  male who presents today for screening colonoscopy. He has a past medical history of H/O cardiovascular stress test, H/O doppler abd aorta ultrasound, H/O doppler lower arterial ultrasound, H/O echocardiogram, hypertension, MRSA (methicillin resistant Staphylococcus aureus), neck problem, pneumonia, shortness of breath on exertion, and tinnitus of both ears. His last colonoscopy was done per patient record in 2010 with normal colon. He denies changes in his bowel pattern. His typical bowel pattern is daily with soft brown formed stools. No diarrhea or constipation. No blood in his stools or melena. No excess belching or flatulence. His appetite is good without early satiety. His weight is stable. No nausea or vomiting. No abdominal pain, bloating or distention. No heartburn or acid reflux. No nocturnal awakenings with acid reflux. No dysphagia or pain with swallowing. No family history of stomach or colon cancer. ROS  Review of Systems   Constitutional:  Negative for chills, diaphoresis, fatigue, fever and unexpected weight change. HENT:  Positive for tinnitus. Negative for ear pain and hearing loss. Eyes:  Positive for visual disturbance. Negative for pain and discharge. Respiratory:  Positive for cough. Negative for shortness of breath and wheezing. Cardiovascular:  Negative for chest pain, palpitations and leg swelling. Gastrointestinal:  Negative for abdominal pain, blood in stool, constipation, diarrhea, nausea and vomiting. Endocrine: Negative for cold intolerance and heat intolerance. Genitourinary:  Negative for dysuria, frequency, hematuria and urgency. Musculoskeletal:  Positive for neck pain. Negative for back pain and myalgias. Skin:  Negative for color change, pallor and rash. Allergic/Immunologic: Positive for environmental allergies. Negative for food allergies. Neurological:  Negative for dizziness, seizures, weakness and headaches. Hematological:  Does not bruise/bleed easily. Psychiatric/Behavioral:  Negative for dysphoric mood and sleep disturbance. The patient is not nervous/anxious. Allergies  No Known Allergies    Medications  Current Outpatient Medications   Medication Sig Dispense Refill    Cetirizine HCl (ZYRTEC ALLERGY PO) Take by mouth      varenicline (CHANTIX STARTING MONTH PAK) 0.5 MG X 11 & 1 MG X 42 tablet Take by mouth. 53 tablet 0    varenicline (CHANTIX CONTINUING MONTH PAK) 1 MG tablet Take 1 tablet by mouth 2 times daily 60 tablet 1    lisinopril (PRINIVIL;ZESTRIL) 10 MG tablet Take 1 tablet by mouth daily 90 tablet 1    Acetaminophen (TYLENOL) 325 MG CAPS Take by mouth      atorvastatin (LIPITOR) 80 MG tablet Take 1 tablet by mouth daily (Patient not taking: Reported on 10/11/2022) 90 tablet 1    aspirin EC 81 MG EC tablet Take 1 tablet by mouth daily (Patient not taking: No sig reported) 90 tablet 3     No current facility-administered medications for this visit. Past medical history:   He has a past medical history of H/O cardiovascular stress test, H/O Doppler abd aorta ultrasound, H/O Doppler lower arterial ultrasound, H/O echocardiogram, Hypertension, MRSA (methicillin resistant Staphylococcus aureus), Neck problem, Pneumonia, Shortness of breath on exertion, and Tinnitus of both ears. Past surgical history:  He has a past surgical history that includes Colonoscopy (2010); other surgical history; Finger surgery (1976); Vasectomy (1999); Tonsillectomy (1968); Shoulder arthroscopy (2000's); Carpal tunnel release (2005); Inguinal hernia repair (9/11/2012); Cataract removal; and Shoulder Debridement (Right, 09/20/2018).     Social History:  He reports that he has been smoking cigarettes. He has a 17.00 pack-year smoking history. His smokeless tobacco use includes chew. He reports that he does not drink alcohol and does not use drugs. Family history:  His family history includes Cancer in his mother and sister; Elevated Lipids in his father and mother; Heart Disease in his father and mother; Hypertension in his father and mother; Mental Illness in his mother; Migraines in his brother; Other in his brother and father; Thyroid Disease in his sister. Objective    Vitals:    10/11/22 0849   BP: 138/86   Pulse: 80   Temp: 97.5 °F (36.4 °C)   SpO2: 90%        Physical exam    Physical Exam  Constitutional:       General: He is not in acute distress. Appearance: Normal appearance. He is well-developed. He is not ill-appearing, toxic-appearing or diaphoretic. HENT:      Head: Normocephalic and atraumatic. Nose: Nose normal.      Mouth/Throat:      Mouth: Mucous membranes are moist.   Cardiovascular:      Rate and Rhythm: Normal rate and regular rhythm. Pulses: Normal pulses. Heart sounds: Normal heart sounds. No murmur heard. No gallop. Pulmonary:      Effort: Pulmonary effort is normal. No respiratory distress. Breath sounds: Normal breath sounds. No stridor. No wheezing or rhonchi. Abdominal:      General: Bowel sounds are normal. There is no distension. Palpations: Abdomen is soft. There is no mass. Tenderness: There is no abdominal tenderness. Hernia: No hernia is present. Musculoskeletal:         General: Normal range of motion. Cervical back: Neck supple. Skin:     General: Skin is warm and dry. Neurological:      Mental Status: He is alert and oriented to person, place, and time.    Psychiatric:         Mood and Affect: Mood normal.       Office Visit on 09/27/2022   Component Date Value Ref Range Status    Sodium 09/27/2022 142  135 - 148 MEQ/L Final    Potassium 09/27/2022 4.2  3.4 - 5.3 MEQ/L Final Chloride 09/27/2022 103  96 - 110 MEQ/L Final    CO2 09/27/2022 25  19 - 32 MEQ/L Final    Glucose 09/27/2022 132 (A)  70 - 99 MG/DL Final    Comment: (NOTE)  The American Diabetic Association recommends the following   guidelines: MG/DL     <100  = NORMAL GLUCOSE  100-125= IMPAIRED FASTING GLUCOSE  >=126 = PROVISIONAL DIAGNOSIS OF DIABETES  ADA Workgroup Report, Diabetic Care, volume 40, January 2017        BUN 09/27/2022 13  3 - 29 MG/DL Final    Creatinine 09/27/2022 0.8  0.5 - 1.4 MG/DL Final    Bun/Cre Ratio 09/27/2022 16  7 - 25 Final    Glomerular Filtration Rate 09/27/2022 101  >60 MLS/MIN/1.73M2 Final    Calcium 09/27/2022 9.8  8.5 - 10.5 MG/DL Final    Total Protein 09/27/2022 6.6  6.0 - 8.3 G/DL Final    Albumin 09/27/2022 4.7  3.5 - 5.2 G/DL Final    Globulin 09/27/2022 1.9  1.9 - 3.6 G/DL Final    Albumin/Globulin Ratio 09/27/2022 2.5  0.8 - 2.6 RATIO Final    Total Bilirubin 09/27/2022 0.6  0.0 - 1.2 MG/DL Final    AST 09/27/2022 26  0 - 55 U/L Final    ALT 09/27/2022 48  0 - 60 U/L Final    Alkaline Phosphatase 09/27/2022 60  23 - 144 U/L Final    Status 09/27/2022 FASTING   Final    Cholesterol 09/27/2022 211 (A)  <200 MG/DL Final    Comment: (NOTE)  DESIRABLE:   <200 MG/DL  BORDERLINE:  200-239 MG/DL  HIGHER RISK:  >239 MG/DL            Triglycerides 09/27/2022 318 (A)  <150 MG/DL Final    Comment: (NOTE)  NORMAL: <150 MG/DL  BORDERLINE HIGH: 150-199 MG/DL  HIGH: 200-499 MG/DL  VERY HIGH: > OR = 500 MG/DL    \"Nonfasting specimens may have modest increases in Triglyceride   levels\" ([employer] Intern Med, 2019)      HDL 09/27/2022 45  MG/DL Final    Comment: (NOTE)  DESIRABLE: > OR = 60 MG/DL  HIGHER RISK: <40 MG/DL          VLDL 09/27/2022 64 (A)  4 - 38 MG/DL Final    LDL Calculated 09/27/2022 102  MG/DL Final    Comment: (NOTE)  OPTIMAL:                    <100 MG/DL  NEAR/ABOVE OPTIMAL:100-129 MG/DL  BORDERLINE HIGH:     130-159 MG/DL  HIGH:                      160-189 MG/DL  VERY HIGH: > OR = 190 MG/DL                   Assessment and Plan:  1. Will plan for a colonoscopy with MAC anesthesia. The patient was informed of the risks and benefits of the procedure. 2.  Further recommendations for follow-up will be determined after the colonoscopy has been completed.

## 2022-10-13 ENCOUNTER — TELEPHONE (OUTPATIENT)
Dept: GASTROENTEROLOGY | Age: 62
End: 2022-10-13

## 2022-10-17 ENCOUNTER — PREP FOR PROCEDURE (OUTPATIENT)
Dept: GASTROENTEROLOGY | Age: 62
End: 2022-10-17

## 2022-10-17 RX ORDER — SODIUM CHLORIDE, SODIUM LACTATE, POTASSIUM CHLORIDE, CALCIUM CHLORIDE 600; 310; 30; 20 MG/100ML; MG/100ML; MG/100ML; MG/100ML
INJECTION, SOLUTION INTRAVENOUS CONTINUOUS
Status: CANCELLED | OUTPATIENT
Start: 2022-10-17

## 2022-10-17 RX ORDER — SODIUM CHLORIDE 0.9 % (FLUSH) 0.9 %
5-40 SYRINGE (ML) INJECTION PRN
Status: CANCELLED | OUTPATIENT
Start: 2022-10-17

## 2022-10-17 RX ORDER — SODIUM CHLORIDE 0.9 % (FLUSH) 0.9 %
5-40 SYRINGE (ML) INJECTION EVERY 12 HOURS SCHEDULED
Status: CANCELLED | OUTPATIENT
Start: 2022-10-17

## 2022-10-17 RX ORDER — SODIUM CHLORIDE 9 MG/ML
25 INJECTION, SOLUTION INTRAVENOUS PRN
Status: CANCELLED | OUTPATIENT
Start: 2022-10-17

## 2022-11-03 ENCOUNTER — ANESTHESIA EVENT (OUTPATIENT)
Dept: OPERATING ROOM | Age: 62
End: 2022-11-03
Payer: COMMERCIAL

## 2022-11-03 RX ORDER — IBUPROFEN 200 MG
200 TABLET ORAL EVERY 6 HOURS PRN
COMMUNITY

## 2022-11-03 RX ORDER — CHLORAL HYDRATE 500 MG
CAPSULE ORAL DAILY
COMMUNITY

## 2022-11-03 RX ORDER — CYANOCOBALAMIN (VITAMIN B-12) 500 MCG
1000 TABLET ORAL
COMMUNITY

## 2022-11-03 RX ORDER — ASCORBIC ACID 500 MG
1000 TABLET ORAL DAILY
COMMUNITY

## 2022-11-03 ASSESSMENT — LIFESTYLE VARIABLES: SMOKING_STATUS: 1

## 2022-11-03 NOTE — ANESTHESIA PRE PROCEDURE
Department of Anesthesiology  Preprocedure Note       Name:  Jeramie Loredo   Age:  63 y.o.  :  1960                                          MRN:  [id number]         Date:  11/3/2022      Surgeon: Sariah Gomes):  Evaristo Carroll MD    Procedure: Procedure(s):  COLORECTAL CANCER SCREENING, NOT HIGH RISK    Medications prior to admission:   Prior to Admission medications    Medication Sig Start Date End Date Taking? Authorizing Provider   ibuprofen (ADVIL;MOTRIN) 200 MG tablet Take 200 mg by mouth every 6 hours as needed for Pain   Yes Historical Provider, MD   Omega-3 Fatty Acids (FISH OIL) 1000 MG capsule Take by mouth daily   Yes Historical Provider, MD   vitamin C (ASCORBIC ACID) 500 MG tablet Take 1,000 mg by mouth daily   Yes Historical Provider, MD   Cyanocobalamin (VITAMIN B 12) 500 MCG TABS Take 1,000 mg by mouth   Yes Historical Provider, MD   Cetirizine HCl (ZYRTEC ALLERGY PO) Take by mouth    Historical Provider, MD   bisacodyl 5 MG EC tablet Take 4 tablets once for colonoscopy prep 10/11/22   KADI Mcgrath - CNP   varenicline (CHANTIX STARTING MONTH ) 0.5 MG X 11 & 1 MG X 42 tablet Take by mouth. 22   Thea Schmidt MD   varenicline (CHANTIX CONTINUING MONTH ) 1 MG tablet Take 1 tablet by mouth 2 times daily 22   Thea Schmidt MD   lisinopril (PRINIVIL;ZESTRIL) 10 MG tablet Take 1 tablet by mouth daily 22   Thea Schmidt MD   atorvastatin (LIPITOR) 80 MG tablet Take 1 tablet by mouth daily  Patient not taking: Reported on 10/11/2022 9/27/22   Thea Schmidt MD   Acetaminophen (TYLENOL) 325 MG CAPS Take by mouth    Historical Provider, MD   aspirin EC 81 MG EC tablet Take 1 tablet by mouth daily  Patient not taking: No sig reported 20   Ling Harvey MD       Current medications:    No current facility-administered medications for this encounter.      Current Outpatient Medications   Medication Sig Dispense Refill    ibuprofen (ADVIL;MOTRIN) 200 MG tablet Take 200 mg by mouth every 6 hours as needed for Pain      Omega-3 Fatty Acids (FISH OIL) 1000 MG capsule Take by mouth daily      vitamin C (ASCORBIC ACID) 500 MG tablet Take 1,000 mg by mouth daily      Cyanocobalamin (VITAMIN B 12) 500 MCG TABS Take 1,000 mg by mouth      Cetirizine HCl (ZYRTEC ALLERGY PO) Take by mouth      bisacodyl 5 MG EC tablet Take 4 tablets once for colonoscopy prep 4 tablet 0    varenicline (CHANTIX STARTING MONTH PAK) 0.5 MG X 11 & 1 MG X 42 tablet Take by mouth. 53 tablet 0    varenicline (CHANTIX CONTINUING MONTH PAK) 1 MG tablet Take 1 tablet by mouth 2 times daily 60 tablet 1    lisinopril (PRINIVIL;ZESTRIL) 10 MG tablet Take 1 tablet by mouth daily 90 tablet 1    atorvastatin (LIPITOR) 80 MG tablet Take 1 tablet by mouth daily (Patient not taking: Reported on 10/11/2022) 90 tablet 1    Acetaminophen (TYLENOL) 325 MG CAPS Take by mouth      aspirin EC 81 MG EC tablet Take 1 tablet by mouth daily (Patient not taking: No sig reported) 90 tablet 3       Allergies:  No Known Allergies    Problem List:    Patient Active Problem List   Diagnosis Code    Elevated liver enzymes R74.8    Rash R21    MRSA colonization Z22.322    Tobacco chew use Z72.0    Hernia, inguinal K40.90    Tobacco use Z72.0    Essential hypertension I10    ASCVD (arteriosclerotic cardiovascular disease) I25.10    Elevated coronary artery calcium score R93.1    PAD (peripheral artery disease) (HCC) I73.9    ASCVD (arteriosclerotic cardiovascular disease) I25.10       Past Medical History:        Diagnosis Date    H/O cardiovascular stress test 03/19/2019    Normal study.  H/O Doppler abd aorta ultrasound 03/19/2019    No evidence of AAA within the visualized portions of the abdominal aorta.  H/O Doppler lower arterial ultrasound 03/18/2019    R SFA 20-49% stenosis, Right ABIs show Mild peripheral arterial disease.     H/O echocardiogram 03/18/2019    EF 55-60%, WNL    Hypertension  MRSA (methicillin resistant Staphylococcus aureus) \" And Early \"    \"Butt Twice\"    Neck problem     \"Get Adjusted As Needed  Sees Dr. Brizuela Poll" disc problems    Pneumonia \"As A Child\"    No Current Symtoms    Shortness of breath on exertion     Tinnitus of both ears        Past Surgical History:        Procedure Laterality Date    CARPAL TUNNEL RELEASE  2005    Right    CATARACT REMOVAL      bid    COLONOSCOPY  2010    due 2020    FINGER SURGERY  1976    Reattached Right Little Finger Due To Table Saw Accident   [place]  2012    Left     OTHER SURGICAL HISTORY      Family Physician Is Dr. Nadia Stokes. [facility],  cauterization on right nasal passage.  SHOULDER ARTHROSCOPY  's    Left     SHOULDER DEBRIDEMENT Right 2018    TONSILLECTOMY  1968    VASECTOMY         Social History:    Social History     Tobacco Use    Smoking status: Every Day     Packs/day: 0.50     Years: 34.00     Pack years: 17.00     Types: Cigarettes     Last attempt to quit: 2013     Years since quittin.8    Smokeless tobacco: Current     Types: Chew    Tobacco comments:     Ask patient not to chew tobacco 24-48- hours before procedure. Substance Use Topics    Alcohol use: No     Alcohol/week: 0.0 standard drinks                                Ready to quit: Not Answered  Counseling given: Not Answered  Tobacco comments: Ask patient not to chew tobacco 24-48- hours before procedure.       Vital Signs (Current):   Vitals:    22 1330   Weight: 200 lb (90.7 kg)   Height: 5' 10\" (1.778 m)                                              BP Readings from Last 3 Encounters:   10/11/22 138/86   22 118/76   21 112/72       NPO Status:                                                                                 BMI:   Wt Readings from Last 3 Encounters:   10/11/22 199 lb 12.8 oz (90.6 kg)   22 197 lb 12.8 oz (89.7 kg) 08/31/21 193 lb (87.5 kg)     Body mass index is 28.7 kg/m². CBC: No results found for: WBC, RBC, HGB, HCT, MCV, RDW, PLT    CMP:   Lab Results   Component Value Date/Time     09/27/2022 01:40 PM    K 4.2 09/27/2022 01:40 PM     09/27/2022 01:40 PM    CO2 25 09/27/2022 01:40 PM    BUN 13 09/27/2022 01:40 PM    CREATININE 0.8 09/27/2022 01:40 PM    GFRAA 95 08/31/2021 08:26 AM    AGRATIO 2.0 01/14/2019 07:37 AM    LABGLOM 82 08/31/2021 08:26 AM    LABGLOM 84 01/14/2019 07:37 AM    GLUCOSE 132 09/27/2022 01:40 PM    PROT 6.6 09/27/2022 01:40 PM    PROT 7.0 12/04/2012 12:00 AM    CALCIUM 9.8 09/27/2022 01:40 PM    BILITOT 0.6 09/27/2022 01:40 PM    ALKPHOS 60 09/27/2022 01:40 PM    AST 26 09/27/2022 01:40 PM    ALT 48 09/27/2022 01:40 PM       POC Tests: No results for input(s): POCGLU, POCNA, POCK, POCCL, POCBUN, POCHEMO, POCHCT in the last 72 hours. Coags: No results found for: PROTIME, INR, APTT    HCG (If Applicable): No results found for: PREGTESTUR, PREGSERUM, HCG, HCGQUANT     ABGs: No results found for: PHART, PO2ART, ACK2XHO, NGG9GLJ, BEART, S1ZLPRDJ     Type & Screen (If Applicable):  No results found for: LABABO, LABRH    Drug/Infectious Status (If Applicable):  No results found for: HIV, HEPCAB    COVID-19 Screening (If Applicable):   Lab Results   Component Value Date/Time    COVID19 NOT DETECTED 12/14/2020 07:54 AM           Anesthesia Evaluation  Patient summary reviewed no history of anesthetic complications:   Airway: Mallampati: II  TM distance: >3 FB   Neck ROM: full  Mouth opening: > = 3 FB   Dental: normal exam         Pulmonary:normal exam    (+) current smoker                           Cardiovascular:    (+) hypertension:, CAD:, hyperlipidemia         Beta Blocker:  Not on Beta Blocker      ROS comment: Stress test 8/2021:  Summary   Supervising physician Dr. Ponce Figures tracer uptake in all segments of myocardium on stress ans rest   images.  Normal Stress nuclear scintigraphic study suggestive of normal   myocardial perfusion. Gated images demonstrate normal left ventricular   systolic function with EF of 60 %.     Recommendation   Continue present medical therapy and followup in office as scheduled.       Echo 3/2019:  Summary   LV function and size are normal, Ejection Fraction 55-60 %. Normal left ventricular wall thickness. No regional wall motion abnormalities were detected. Diastolic Dysfunction Grade I . All chamber dimensions are within normal limits. No significant valvular disease noted. RVSP= 28 mmHg. No evidence of pericardial effusion. Neuro/Psych:               GI/Hepatic/Renal:   (+) bowel prep,      (-) GERD       Endo/Other: Negative Endo/Other ROS                    Abdominal:             Vascular:   + PVD, aortic or cerebral, . Other Findings:           Anesthesia Plan      MAC     ASA 3       Induction: intravenous. Anesthetic plan and risks discussed with patient. Vicci Closs, APRN - CRNA   11/3/2022         Pre Anesthesia Assessment complete.  Chart reviewed on 11/3/2022

## 2022-11-03 NOTE — PROGRESS NOTES
Patient will be called with an arrival time on 11/4/2022 for his procedure at [facility] on 11/7/2022. 1. Do not eat or drink anything after 12 midnight (or____hours) unless instructed by your doctor prior to surgery. This includes no water, chewing gum or mints. NO chewing tobacco.  2. Follow your directions as prescribed by the doctor for your procedure and medications. 3.Check with your Doctor regarding stopping Plavix, Coumadin, Lovenox,Effient,Pradaxa,Xarelto, Fragmin or other blood thinners and follow their instructions. 4. Do not smoke, and do not drink any alcoholic beverages 24 hours prior to surgery. This includes NA Beer. 5. You may brush your teeth and gargle the morning of surgery. DO NOT SWALLOW WATER  6. You MUST make arrangements for a responsible adult to take you home after your surgery and be able to check on you every couple hours for the day. You will not be allowed     to leave alone or drive yourself home. It is strongly suggested someone stay with you the first 24 hrs. Your surgery will be cancelled if you do not have a ride home. 7. Please wear simple, loose fitting clothing to the hospital.  Adryan Hu not bring valuables (money, credit cards, checkbooks, etc.) Do not wear any makeup (including no eye makeup) or nail polish on your fingers or toes. 8. DO NOT wear any jewelry or piercings on day of surgery. All body piercing jewelry must be removed  9. If you have dentures, they will be removed before going to the OR; we will provide you a container. If you wear contact lenses or glasses, they will be removed; please bring a case for them. 10. If you  have a Living Will and Durable Power of  for Healthcare, please bring in a copy. 11 Please bring picture ID,  insurance card, paperwork from the doctors office    (H & P, Consent, & card for implantable devices). Patient verbalized understanding concerning colon prep instructions and had no questions at this time.

## 2022-11-04 NOTE — PROGRESS NOTES
Spoke with patient's brother and he will give patient the message to arrive at 0930 at [facility] on 11/7/2022 his procedure at 1030. patient's voicemail is full.

## 2022-11-07 ENCOUNTER — HOSPITAL ENCOUNTER (OUTPATIENT)
Age: 62
Setting detail: OUTPATIENT SURGERY
Discharge: HOME | End: 2022-11-07
Attending: INTERNAL MEDICINE | Admitting: INTERNAL MEDICINE
Payer: COMMERCIAL

## 2022-11-07 ENCOUNTER — ANESTHESIA (OUTPATIENT)
Dept: OPERATING ROOM | Age: 62
End: 2022-11-07
Payer: COMMERCIAL

## 2022-11-07 VITALS
HEIGHT: 70 IN | BODY MASS INDEX: 28.63 KG/M2 | SYSTOLIC BLOOD PRESSURE: 135 MMHG | WEIGHT: 200 LBS | DIASTOLIC BLOOD PRESSURE: 98 MMHG | RESPIRATION RATE: 18 BRPM | TEMPERATURE: 98.2 F | HEART RATE: 77 BPM | OXYGEN SATURATION: 95 %

## 2022-11-07 DIAGNOSIS — Z12.11 SCREEN FOR COLON CANCER: ICD-10-CM

## 2022-11-07 PROCEDURE — 2580000003 HC RX 258: Performed by: NURSE PRACTITIONER

## 2022-11-07 PROCEDURE — 3609010600 HC COLONOSCOPY POLYPECTOMY SNARE/COLD BIOPSY: Performed by: INTERNAL MEDICINE

## 2022-11-07 PROCEDURE — 7100000011 HC PHASE II RECOVERY - ADDTL 15 MIN: Performed by: INTERNAL MEDICINE

## 2022-11-07 PROCEDURE — 3700000000 HC ANESTHESIA ATTENDED CARE: Performed by: INTERNAL MEDICINE

## 2022-11-07 PROCEDURE — 2709999900 HC NON-CHARGEABLE SUPPLY: Performed by: INTERNAL MEDICINE

## 2022-11-07 PROCEDURE — 45385 COLONOSCOPY W/LESION REMOVAL: CPT | Performed by: INTERNAL MEDICINE

## 2022-11-07 PROCEDURE — 6360000002 HC RX W HCPCS: Performed by: NURSE ANESTHETIST, CERTIFIED REGISTERED

## 2022-11-07 PROCEDURE — 88305 TISSUE EXAM BY PATHOLOGIST: CPT

## 2022-11-07 PROCEDURE — 7100000010 HC PHASE II RECOVERY - FIRST 15 MIN: Performed by: INTERNAL MEDICINE

## 2022-11-07 PROCEDURE — 3700000001 HC ADD 15 MINUTES (ANESTHESIA): Performed by: INTERNAL MEDICINE

## 2022-11-07 RX ORDER — SODIUM CHLORIDE 0.9 % (FLUSH) 0.9 %
5-40 SYRINGE (ML) INJECTION EVERY 12 HOURS SCHEDULED
Status: DISCONTINUED | OUTPATIENT
Start: 2022-11-07 | End: 2022-11-07 | Stop reason: HOSPADM

## 2022-11-07 RX ORDER — PROPOFOL 10 MG/ML
INJECTION, EMULSION INTRAVENOUS PRN
Status: DISCONTINUED | OUTPATIENT
Start: 2022-11-07 | End: 2022-11-07 | Stop reason: SDUPTHER

## 2022-11-07 RX ORDER — SODIUM CHLORIDE 9 MG/ML
25 INJECTION, SOLUTION INTRAVENOUS PRN
Status: DISCONTINUED | OUTPATIENT
Start: 2022-11-07 | End: 2022-11-07 | Stop reason: HOSPADM

## 2022-11-07 RX ORDER — FENTANYL CITRATE 50 UG/ML
INJECTION, SOLUTION INTRAMUSCULAR; INTRAVENOUS PRN
Status: DISCONTINUED | OUTPATIENT
Start: 2022-11-07 | End: 2022-11-07 | Stop reason: SDUPTHER

## 2022-11-07 RX ORDER — LIDOCAINE HYDROCHLORIDE 20 MG/ML
INJECTION, SOLUTION INTRAVENOUS PRN
Status: DISCONTINUED | OUTPATIENT
Start: 2022-11-07 | End: 2022-11-07 | Stop reason: SDUPTHER

## 2022-11-07 RX ORDER — SODIUM CHLORIDE 0.9 % (FLUSH) 0.9 %
5-40 SYRINGE (ML) INJECTION PRN
Status: DISCONTINUED | OUTPATIENT
Start: 2022-11-07 | End: 2022-11-07 | Stop reason: HOSPADM

## 2022-11-07 RX ORDER — SODIUM CHLORIDE, SODIUM LACTATE, POTASSIUM CHLORIDE, CALCIUM CHLORIDE 600; 310; 30; 20 MG/100ML; MG/100ML; MG/100ML; MG/100ML
INJECTION, SOLUTION INTRAVENOUS CONTINUOUS
Status: DISCONTINUED | OUTPATIENT
Start: 2022-11-07 | End: 2022-11-07 | Stop reason: HOSPADM

## 2022-11-07 RX ADMIN — PROPOFOL 400 MG: 10 INJECTION, EMULSION INTRAVENOUS at 11:08

## 2022-11-07 RX ADMIN — FENTANYL CITRATE 100 MCG: 50 INJECTION, SOLUTION INTRAMUSCULAR; INTRAVENOUS at 11:13

## 2022-11-07 RX ADMIN — SODIUM CHLORIDE, POTASSIUM CHLORIDE, SODIUM LACTATE AND CALCIUM CHLORIDE: 600; 310; 30; 20 INJECTION, SOLUTION INTRAVENOUS at 09:53

## 2022-11-07 RX ADMIN — LIDOCAINE HYDROCHLORIDE 100 MG: 20 INJECTION, SOLUTION INTRAVENOUS at 11:08

## 2022-11-07 ASSESSMENT — PAIN SCALES - GENERAL
PAINLEVEL_OUTOF10: 0
PAINLEVEL_OUTOF10: 1
PAINLEVEL_OUTOF10: 0

## 2022-11-07 ASSESSMENT — PAIN DESCRIPTION - ONSET: ONSET: ON-GOING

## 2022-11-07 ASSESSMENT — PAIN DESCRIPTION - FREQUENCY: FREQUENCY: CONTINUOUS

## 2022-11-07 ASSESSMENT — PAIN DESCRIPTION - LOCATION: LOCATION: NECK

## 2022-11-07 ASSESSMENT — PAIN DESCRIPTION - ORIENTATION: ORIENTATION: MID

## 2022-11-07 ASSESSMENT — PAIN DESCRIPTION - DESCRIPTORS: DESCRIPTORS: ACHING

## 2022-11-07 ASSESSMENT — PAIN DESCRIPTION - PAIN TYPE: TYPE: CHRONIC PAIN

## 2022-11-07 ASSESSMENT — PAIN - FUNCTIONAL ASSESSMENT: PAIN_FUNCTIONAL_ASSESSMENT: ACTIVITIES ARE NOT PREVENTED

## 2022-11-07 NOTE — PROGRESS NOTES
Bori[address]aur Enpar[address] 29 Report received from [facility]. Pt denies c/o. Pt abdomen soft and non tender. DR. Fuentes Levar in room to discuss procedure. Brother to bedside. PT given water. 0912 In to check on pt. Pt denies c/o. Pt abdomen soft and non tender. 1214 Discharge instructions given to pt and brother. Both verbalized understanding of instructions. 1220 Pt up to side of bed. Pt tolerated well and ready to get dressed to go home. Call light in reach. Brother to get car. 46 Pt discharged to home to brothers awaiting vehicle.

## 2022-11-07 NOTE — PROGRESS NOTES
Patient is back to baseline. Alert and oriented. Report given to Malik Dai RN. No family at bedside.

## 2022-11-07 NOTE — PROGRESS NOTES
Received report from [facility]. Patient alert and oriented. Verified patient's name, , allergies and procedure. No beta blockers, no blood thinners, no implants. H&P on chart. BrotherVick at bedside.

## 2022-11-07 NOTE — ANESTHESIA POSTPROCEDURE EVALUATION
Department of Anesthesiology  Postprocedure Note    Patient: Dung Cooley  MRN: [id number]  YOB: 1960  Date of evaluation: 11/7/2022      Procedure Summary     Date: 11/07/22 Room / Location: [facility] 12 04 / [facility]    Anesthesia Start: 1101 Anesthesia Stop: 1143    Procedure: COLONOSCOPY POLYPECTOMY SNARE/COLD BIOPSY Diagnosis:       Screen for colon cancer      (Screen for colon cancer [Z12.11])    Surgeons:  Riya Quiñonez MD Responsible Provider: KADI Her CRNA    Anesthesia Type: MAC ASA Status: 3          Anesthesia Type: MAC    August Phase I:      August Phase II:        Anesthesia Post Evaluation    Patient location during evaluation: bedside  Patient participation: complete - patient participated  Level of consciousness: sleepy but conscious  Pain score: 0  Airway patency: patent  Nausea & Vomiting: no nausea and no vomiting  Complications: no  Cardiovascular status: blood pressure returned to baseline and hemodynamically stable  Respiratory status: acceptable  Hydration status: stable

## 2022-11-07 NOTE — DISCHARGE INSTRUCTIONS
COLONOSCOPY    _[facility]________________________________    OFFICE [id number]___[phone]_____________________    FOLLOW UP APPOINTMENT  AS NEEDED. REPEAT PROCEDURE to be determined by pathology. Call office next week for biopsy results. TEST ORDERED: NONE     What to expect at home: Your Recovery   Your doctor will tell you when you can eat and do your other usual activities Your doctor will talk to you about when you will need your next colonoscopy. Your doctor can help you decide how often you need to be checked. This will depend on the results of your test and your risk for colorectal cancer. After the test, you may be bloated or have gas pains. You may need to pass gas. If a biopsy was done or a polyp was removed, you may have streaks of blood in your stool (feces) for a few days. This care sheet gives you a general idea about how long it will take for you to recover. But each person recovers at a different pace. Follow the steps below to get better as quickly as possible. How can you care for yourself at home? Activity  Rest when you feel tired. Diet  Follow your doctor's directions for eating. Unless your doctor has told you not to, drink plenty of fluids. This helps to replace the fluids that were lost during the colon prep. DO NOT DRINK ALCOHOL. Medicines  Your doctor will tell you if and when you can restart your medicines. He or she will also give you instructions about taking any new medicines. If you take blood thinners, such as warfarin (Coumadin), clopidogrel (Plavix), or aspirin, be sure to talk to your doctor. He or she will tell you if and when to start taking those medicines again. Make sure that you understand exactly what your doctor wants you to do. If polyps were removed or a biopsy was done during the test, your doctor may tell you not to take aspirin or other anti-inflammatory medicines for a few days. These include ibuprofen (Advil, Motrin) and naproxen (Aleve).   Other instructions: Anethesia  For your safety, do not drive or operate machinery for 24 hours. Do not sign legal documents or make major decisions for 24 hours. The anesthesia can make it hard for you to fully understand what you are agreeing to. Follow-up care is a key part of your treatment and safety. Be sure to make and go to all appointments, and call your doctor if you are having problems. It's also a good idea to know your test results and keep a list of the medicines you take. When should you call for help? 1 [facility] Daisha Chaveznicanor Lira [phone]  Call 911 anytime you think you may need emergency care. For example, call if:  You passed out (lost consciousness). You pass maroon or bloody stools. You have severe belly pain. Call your doctor now or seek immediate medical care if:  Your stools are black and tarlike. Your stools have streaks of blood, but you did not have a biopsy or any polyps removed. You have belly pain, or your belly is swollen and firm. You vomit. You have a fever. You are very dizzy. Watch closely for changes in your health, and be sure to contact your doctor if you have any problems. Where can you learn more? Go to https://[employer]. org and sign in to your [employer] account. Enter E264 in the [facility] box to learn more about Colonoscopy: What to Expect at Home.     If you do not have an account, please click on the Sign Up Now link. © 6012-3838 Healthwise, Incorporated. Care instructions adapted under license by [employer]. This care instruction is for use with your licensed healthcare professional. If you have questions about a medical condition or this instruction, always ask your healthcare professional. [place] [address] any warranty or liability for your use of this information.   Content Version: 02.0.214070; Current as of: November 20, 2015 [facility]  [phone]    Do not drive, work around [address] [place], [place] [address]th  or use equipment. Do not drink any alcoholic beverages. Do not smoke while alone. Avoid making important decisions. Plan to spend a quiet, relaxed evening @ home. Resume normal activities as you begin to feel better. Eat lightly for your first meal, then gradually increase your diet to what is normal for you. In case of nausea, avoid food and drink only clear liquids. Resume food as nausea ceases. Notify your surgeon if you experience fever, chills, large amount of bleeding, difficulty breathing, persistent nausea and vomiting or any other disturbing problem. Call for a follow-up appointment with your surgeon. Advance Care Planning  People with COVID-19 may have no symptoms, mild symptoms, such as fever, cough, and shortness of breath or they may have more severe illness, developing severe and fatal pneumonia. As a result, Advance Care Planning with attention to naming a health care decision maker (someone you trust to make healthcare decisions for you if you could not speak for yourself) and sharing other health care preferences is important BEFORE a possible health crisis. Please contact your Primary Care Provider to discuss Advance Care Planning. Preventing the Spread of Coronavirus Disease 2019 in Homes and Residential Communities  For the most recent information go to [employer].fi    Prevention steps for People with confirmed or suspected COVID-19 (including persons under investigation) who do not need to be hospitalized  and   People with confirmed COVID-19 who were hospitalized and determined to be medically stable to go home    Your healthcare provider and public health staff will evaluate whether you can be cared for at home.  If it is determined that you do not need to be hospitalized and can be isolated at home, you will be monitored by staff from your local or state health department. You should follow the prevention steps below until a healthcare provider or local or state health department says you can return to your normal activities. Stay home except to get medical care  People who are mildly ill with COVID-19 are able to isolate at home during their illness. You should restrict activities outside your home, except for getting medical care. Do not go to work, school, or public areas. Avoid using public transportation, ride-sharing, or taxis. Separate yourself from other people and animals in your home  People: As much as possible, you should stay in a specific room and away from other people in your home. Also, you should use a separate bathroom, if available. Animals: You should restrict contact with pets and other animals while you are sick with COVID-19, just like you would around other people. Although there have not been reports of pets or other animals becoming sick with COVID-19, it is still recommended that people sick with COVID-19 limit contact with animals until more information is known about the virus. When possible, have another member of your household care for your animals while you are sick. If you are sick with COVID-19, avoid contact with your pet, including petting, snuggling, being kissed or licked, and sharing food. If you must care for your pet or be around animals while you are sick, wash your hands before and after you interact with pets and wear a facemask. Call ahead before visiting your doctor  If you have a medical appointment, call the healthcare provider and tell them that you have or may have COVID-19. This will help the healthcare providers office take steps to keep other people from getting infected or exposed. Wear a facemask  You should wear a facemask when you are around other people (e.g., sharing a room or vehicle) or pets and before you enter a healthcare providers office.  If you are not able to wear a facemask (for example, because it causes trouble breathing), then people who live with you should not stay in the same room with you, or they should wear a facemask if they enter your room. Cover your coughs and sneezes  Cover your mouth and nose with a tissue when you cough or sneeze. Throw used tissues in a lined trash can. Immediately wash your hands with soap and water for at least 20 seconds or, if soap and water are not available, clean your hands with an alcohol-based hand  that contains at least 60% alcohol. Clean your hands often  Wash your hands often with soap and water for at least 20 seconds, especially after blowing your nose, coughing, or sneezing; going to the bathroom; and before eating or preparing food. If soap and water are not readily available, use an alcohol-based hand  with at least 60% alcohol, covering all surfaces of your hands and rubbing them together until they feel dry. Soap and water are the best option if hands are visibly dirty. Avoid touching your eyes, nose, and mouth with unwashed hands. Avoid sharing personal household items  You should not share dishes, drinking glasses, cups, eating utensils, towels, or bedding with other people or pets in your home. After using these items, they should be washed thoroughly with soap and water. Clean all high-touch surfaces everyday  High touch surfaces include counters, tabletops, doorknobs, bathroom fixtures, toilets, phones, keyboards, tablets, and bedside tables. Also, clean any surfaces that may have blood, stool, or body fluids on them. Use a household cleaning spray or wipe, according to the label instructions. Labels contain instructions for safe and effective use of the cleaning product including precautions you should take when applying the product, such as wearing gloves and making sure you have good ventilation during use of the product.   Monitor your symptoms  Seek prompt medical attention if your illness is worsening (e.g., difficulty breathing). Before seeking care, call your healthcare provider and tell them that you have, or are being evaluated for, COVID-19. Put on a facemask before you enter the facility. These steps will help the healthcare providers office to keep other people in the office or waiting room from getting infected or exposed. Ask your healthcare provider to call the local or state health department. Persons who are placed under active monitoring or facilitated self-monitoring should follow instructions provided by their local health department or occupational health professionals, as appropriate. When working with your local health department check their available hours. If you have a medical emergency and need to call 911, notify the dispatch personnel that you have, or are being evaluated for COVID-19. If possible, put on a facemask before emergency medical services arrive. Discontinuing home isolation  Patients with confirmed COVID-19 should remain under home isolation precautions until the risk of secondary transmission to others is thought to be low. The decision to discontinue home isolation precautions should be made on a case-by-case basis, in consultation with healthcare providers and state and local health departments.

## 2022-11-07 NOTE — INTERVAL H&P NOTE
Update History & Physical    The patient's History and Physical of October 11, 2022 was reviewed with the patient and I examined the patient. There was no change. The surgical site was confirmed by the patient and me. Plan: The risks, benefits, expected outcome, and alternative to the recommended procedure have been discussed with the patient. Patient understands and wants to proceed with the procedure.      Electronically signed by David Fontanez MD on 11/7/2022 at 9:48 AM

## 2022-12-07 PROBLEM — Z12.11 SCREEN FOR COLON CANCER: Status: RESOLVED | Noted: 2022-11-07 | Resolved: 2022-12-07

## 2022-12-08 ENCOUNTER — TELEPHONE (OUTPATIENT)
Dept: CARDIOLOGY CLINIC | Age: 62
End: 2022-12-08

## 2022-12-08 DIAGNOSIS — I73.9 CLAUDICATION (HCC): Primary | ICD-10-CM

## 2022-12-08 RX ORDER — MAGNESIUM OXIDE 400 MG/1
400 TABLET ORAL DAILY
Qty: 30 TABLET | Refills: 1 | Status: SHIPPED | OUTPATIENT
Start: 2022-12-08

## 2022-12-08 RX ORDER — CILOSTAZOL 50 MG/1
50 TABLET ORAL 2 TIMES DAILY
Qty: 60 TABLET | Refills: 3 | Status: SHIPPED | OUTPATIENT
Start: 2022-12-08

## 2022-12-08 NOTE — TELEPHONE ENCOUNTER
Patient getting cramp in leg when walking then cramp goes away and toes go numb, next day thigh is sore for couple of days. Wants to know if PCP who he has appt sched with next, can order test or does he need to sched with Vera?  Call patient

## 2022-12-08 NOTE — TELEPHONE ENCOUNTER
Start Pletal 50 mg twice daily, take magnesium 400 mg daily  Check arterial Doppler  See me in office after doing studies and taking medication for a few weeks to see if there is any improvement

## 2022-12-09 ENCOUNTER — PROCEDURE VISIT (OUTPATIENT)
Dept: CARDIOLOGY CLINIC | Age: 62
End: 2022-12-09

## 2022-12-09 DIAGNOSIS — I73.9 CLAUDICATION (HCC): ICD-10-CM

## 2022-12-12 ENCOUNTER — TELEPHONE (OUTPATIENT)
Dept: CARDIOLOGY CLINIC | Age: 62
End: 2022-12-12

## 2022-12-12 NOTE — TELEPHONE ENCOUNTER
follow up in office to plan Treatment options. Called to schedule appt with Dr Aide Adam to review results. No answer and voicemail box is full. Spoke to pt regarding follow up in office.  He states he has an appt 01/13/2023 @ 11:15am.

## 2022-12-13 ENCOUNTER — TELEPHONE (OUTPATIENT)
Dept: CARDIOLOGY CLINIC | Age: 62
End: 2022-12-13

## 2022-12-13 ENCOUNTER — OFFICE VISIT (OUTPATIENT)
Dept: CARDIOLOGY CLINIC | Age: 62
End: 2022-12-13
Payer: COMMERCIAL

## 2022-12-13 VITALS
WEIGHT: 199.6 LBS | BODY MASS INDEX: 28.58 KG/M2 | HEART RATE: 117 BPM | SYSTOLIC BLOOD PRESSURE: 128 MMHG | HEIGHT: 70 IN | DIASTOLIC BLOOD PRESSURE: 84 MMHG

## 2022-12-13 DIAGNOSIS — I73.9 PVD (PERIPHERAL VASCULAR DISEASE) (HCC): ICD-10-CM

## 2022-12-13 DIAGNOSIS — Z72.0 TOBACCO CHEW USE: ICD-10-CM

## 2022-12-13 DIAGNOSIS — I10 ESSENTIAL HYPERTENSION: ICD-10-CM

## 2022-12-13 DIAGNOSIS — I25.10 ASCVD (ARTERIOSCLEROTIC CARDIOVASCULAR DISEASE): Primary | ICD-10-CM

## 2022-12-13 DIAGNOSIS — Z72.0 TOBACCO USE: ICD-10-CM

## 2022-12-13 PROCEDURE — 3074F SYST BP LT 130 MM HG: CPT | Performed by: INTERNAL MEDICINE

## 2022-12-13 PROCEDURE — 3078F DIAST BP <80 MM HG: CPT | Performed by: INTERNAL MEDICINE

## 2022-12-13 PROCEDURE — 93000 ELECTROCARDIOGRAM COMPLETE: CPT | Performed by: INTERNAL MEDICINE

## 2022-12-13 PROCEDURE — 99214 OFFICE O/P EST MOD 30 MIN: CPT | Performed by: INTERNAL MEDICINE

## 2022-12-13 RX ORDER — ASPIRIN 81 MG/1
81 TABLET ORAL DAILY
Qty: 90 TABLET | Refills: 1 | Status: SHIPPED | OUTPATIENT
Start: 2022-12-13

## 2022-12-13 RX ORDER — ASPIRIN 81 MG/1
81 TABLET ORAL DAILY
Qty: 90 TABLET | Refills: 3 | Status: SHIPPED | OUTPATIENT
Start: 2022-12-13

## 2022-12-13 NOTE — PROGRESS NOTES
CARDIOLOGY  NOTE                    Chief Complaint: High calcium score    HPI:   Fermin Javier is a 63y.o. year old who has history as noted below. .  He says he is having a lot of pain in his right leg when he walks limiting his work , pletal has not helped  Arterial doppler shows 100 % occluded SFA on right side   HE stopped taking lipitor on his own   Today his ekg shows sinus tachycardia    He underwent screening with a cardiac CT showing calcium score of 1949.5 . Stress test shows no ischemia. 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 He does feel lightheaded and dizzy once in a while.2 days ago he got up quickly   He denies any chest pain but he has noted some shortness of breath especially when he is exerting. Once in a while he feels he gest tired and legs get weak when he returns from work  Unfortunately he smokes a  Half to full pack a day and also chews tobacco.HE is trying to cut down   Father had heart problems in his 46s, requiring multiple surgeries bypass and peripheral interventions. Mother also had heart problems. Lianne Pritchett   He drives a snow removal of truck for [employer]       Current Outpatient Medications   Medication Sig Dispense Refill    aspirin EC 81 MG EC tablet Take 1 tablet by mouth daily 90 tablet 3    cilostazol (PLETAL) 50 MG tablet Take 1 tablet by mouth 2 times daily 60 tablet 3    magnesium oxide (MAG-OX) 400 MG tablet Take 1 tablet by mouth daily 30 tablet 1    NONFORMULARY Patient states' he uses something called poppers for sexual arousement\"      ibuprofen (ADVIL;MOTRIN) 200 MG tablet Take 200 mg by mouth every 6 hours as needed for Pain      Cyanocobalamin (VITAMIN B 12) 500 MCG TABS Take 1,000 mg by mouth      Cetirizine HCl (ZYRTEC ALLERGY PO) Take by mouth      bisacodyl 5 MG EC tablet Take 4 tablets once for colonoscopy prep 4 tablet 0    lisinopril (PRINIVIL;ZESTRIL) 10 MG tablet Take 1 tablet by mouth daily 90 tablet 1    atorvastatin (LIPITOR) 80 MG tablet Take 1 tablet by mouth daily 90 tablet 1    Acetaminophen (TYLENOL) 325 MG CAPS Take by mouth      Omega-3 Fatty Acids (FISH OIL) 1000 MG capsule Take by mouth daily (Patient not taking: No sig reported)      vitamin C (ASCORBIC ACID) 500 MG tablet Take 1,000 mg by mouth daily (Patient not taking: No sig reported)      varenicline (CHANTIX STARTING MONTH FILOMENA) 0.5 MG X 11 & 1 MG X 42 tablet Take by mouth. (Patient not taking: No sig reported) 53 tablet 0    varenicline (CHANTIX CONTINUING MONTH PAK) 1 MG tablet Take 1 tablet by mouth 2 times daily (Patient not taking: No sig reported) 60 tablet 1     No current facility-administered medications for this visit. Allergies:   Patient has no known allergies. Patient History:  Past Medical History:   Diagnosis Date    H/O cardiovascular stress test 03/19/2019    Normal study. H/O Doppler abd aorta ultrasound 03/19/2019    No evidence of AAA within the visualized portions of the abdominal aorta. H/O Doppler lower arterial ultrasound 03/18/2019    R SFA 20-49% stenosis, Right ABIs show Mild peripheral arterial disease.     H/O echocardiogram 03/18/2019    EF 55-60%, WNL    Hypertension     MRSA (methicillin resistant Staphylococcus aureus) \"2010 And Early 2012\"    \"Butt Twice\"    Neck problem     \"Get Adjusted As Needed  Sees Dr. Radha Reis" disc problems    Pneumonia \"As A Child\"    No Current Symtoms    Shortness of breath on exertion     Tinnitus of both ears      Past Surgical History:   Procedure Laterality Date    CARPAL TUNNEL RELEASE  2005    Right    CATARACT REMOVAL      bid    COLONOSCOPY  2010 due 2020    COLONOSCOPY N/A 11/7/2022    COLONOSCOPY POLYPECTOMY SNARE/COLD BIOPSY performed by Harsha Patino MD at [facility]    Reattached Right Little Finger Due To Table Saw Accident    [address]  09/11/2012    Left     OTHER SURGICAL HISTORY      Family Physician Is Dr. Maeve Weiner. Danuta Brown In [facility],  cauterization on right nasal passage. SHOULDER ARTHROSCOPY  's    Left     SHOULDER DEBRIDEMENT Right 2018    TONSILLECTOMY  1968    VASECTOMY       Family History   Problem Relation Age of Onset    Heart Disease Mother     Hypertension Mother     Elevated Lipids Mother     Cancer Mother         \"Breast Cancer\"    Mental Illness Mother         \"Anxiety\"    Cancer Sister         \"Thyroid Cancer\"    Hypertension Father     Elevated Lipids Father     Other Father         \"Stents In Legs\"    Heart Disease Father         \"Heart Stent\"    Other Brother         \"Migraines\"    Migraines Brother     Thyroid Disease Sister      Social History     Tobacco Use    Smoking status: Every Day     Packs/day: 0.50     Years: 34.00     Pack years: 17.00     Types: Cigarettes     Last attempt to quit: 2013     Years since quittin.9    Smokeless tobacco: Current     Types: Chew    Tobacco comments:     Ask patient not to chew tobacco 24-48- hours before procedure. Substance Use Topics    Alcohol use: No     Alcohol/week: 0.0 standard drinks        Review of Systems:   Constitutional: No Fever or Weight Loss   Eyes: No Decreased Vision  ENT: No Headaches, Hearing Loss or Vertigo  Cardiovascular: as per note above   Respiratory: No cough or wheezing and as per note above.    Gastrointestinal: No abdominal pain, appetite loss, blood in stools, constipation, diarrhea or heartburn  Genitourinary: No dysuria, trouble voiding, or hematuria  Musculoskeletal:  None  Integumentary: No rash or pruritis  Neurological: No TIA or stroke symptoms  Psychiatric: No anxiety or depression  Endocrine: No malaise, fatigue or temperature intolerance  Hematologic/Lymphatic: No bleeding problems, blood clots or swollen lymph nodes  Allergic/Immunologic: No nasal congestion or hives    Objective:      Physical Exam:  /84 (Site: Left Upper Arm, Position: Sitting, Cuff Size: Medium Adult) Pulse (!) 117   Ht 5' 10\" (1.778 m)   Wt 199 lb 9.6 oz (90.5 kg)   BMI 28.64 kg/m²   Wt Readings from Last 3 Encounters:   12/13/22 199 lb 9.6 oz (90.5 kg)   11/03/22 200 lb (90.7 kg)   10/11/22 199 lb 12.8 oz (90.6 kg)     Body mass index is 28.64 kg/m². Vitals:    12/13/22 1306   BP: 128/84   Pulse: (!) 117      General Appearance:  No distress, conversant  Constitutional:  Well developed, Well nourished, No acute distress, Non-toxic appearance. HENT:  Normocephalic, Atraumatic, Bilateral external ears normal, Oropharynx moist, No oral exudates, Nose normal. Neck- Normal range of motion, No tenderness, Supple, No stridor,no apical-carotid delay  Eyes:  PERRL, EOMI, Conjunctiva normal, No discharge. Respiratory:  Normal breath sounds, No respiratory distress, No wheezing, No chest tenderness. ,no use of accessory muscles, NO crackles  Cardiovascular: (PMI) apex non displaced,no lifts no thrills,S1 and S2 audible, No added heart sounds, No signs of ankle edema, or volume overload, No evidence of JVD, No crackles  GI:  Bowel sounds normal, Soft, No tenderness, No masses, No gross visceromegaly   :  No costovertebral angle tenderness   Musculoskeletal:  No edema, no tenderness, no deformities.  Back- no tenderness  Integument:  Well hydrated, no rash   Lymphatic:  No lymphadenopathy noted   Neurologic:  Alert & oriented x 3, CN 2-12 normal, normal motor function, normal sensory function, no focal deficits noted   Psychiatric:  Speech and behavior appropriate               Medical decision making and Data review:  DATA:  No results found for: TROPONINT  BNP:  No results found for: PROBNP  PT/INR:  No results found for: PTINR  No results found for: LABA1C  Lab Results   Component Value Date    CHOL 211 (H) 09/27/2022    TRIG 318 (H) 09/27/2022    HDL 45 09/27/2022    LDLCALC 102 09/27/2022    LDLDIRECT 117 (H) 06/10/2015     Lab Results   Component Value Date    ALT 48 09/27/2022    AST 26 09/27/2022     TSH: No results found for: TSH  Lab Results   Component Value Date    AST 26 09/27/2022    ALT 48 09/27/2022    BILITOT 0.6 09/27/2022    ALKPHOS 60 09/27/2022     No results found for: PROBNP  No results found for: LABA1C  No results found for: WBC, HGB, HCT, PLT   Stress  3/19/19       Resting ECG  nsr    Resting HR:82 bpm  Resting BP:132/76 mmHg   Stress Protocol:Exercise - Bjorn    Peak HR:150 bpm                         HR response: Appropriate  Peak BP:172/86 mmHg                     BP response: Normal resting BP -  Predicted HR: 164 bpm                   appropriate response  % of predicted HR: 91                   HR/BP product:19611                                         Max exrecise: 11 METS  Exercise duration: 09:59 min  Reason for termination:Target heart           Summary    Supervising physician Dr. Isma Wesley . normal stress test, Normal tracer uptake    in all myocardial segment during rest and stress. Diaphragmatic attenuation    artifact noted. normal LVEF           Echo 3/18/19   Summary   LV function and size are normal, Ejection Fraction 55-60 %. Normal left ventricular wall thickness. No regional wall motion abnormalities were detected. Diastolic Dysfunction Grade I . All chamber dimensions are within normal limits. No significant valvular disease noted. RVSP= 28 mmHg. No evidence of pericardial effusion. Arterial doppler 3/18/19    Summary        The Right distal SFA exhibits 20-49% stenosis . The Right Distal SFA exhibits calcific plaquing. No focal stenosis noted in the arteries of the left lower extremity. Bilateral lower extremity arteries exhibit triphasic waveforms. Right ABIs show Mild peripheral arterial disease. The Left ADELITA shows normal arterial flow. March 2019   No evidence of AAA within the visualized portions of the abdominal aorta. Arterial doppler 12/9/2022    The Right Proximal and mid SFA exhibits an occlusion.     No evidence of significant occlusive arterial disease in the left lower    extremity. Right ABIs show Moderate peripheral arterial disease, at rest.    The Left ADELITA shows Mild peripheral arterial disease. All labs, medications and tests reviewed by myself including data and history from outside source , patient and available family . Assessment & Plan:      1. ASCVD (arteriosclerotic cardiovascular disease)    2. PVD (peripheral vascular disease) ([address]    3. Tobacco chew use    4. Tobacco use    5. Essential hypertension         ASCVD (arteriosclerotic cardiovascular disease)  Calcium score of 1948. Start aspirin. Cardiolite shows no  ischemia. He should also be screened for AAA, given his history of tobacco abuse and family history of aortic aneurysm. Increase stains to 80 mg   aspirin and   He had no AAA  On screening  In march 2019   Start metoprolol 25 mg bid due to tachycardia, asked to take apirin 81 mg daily     PVD  Continue pletal and plan angiogram to evaluate for right sided sfa occlusion      Tobacco use  We talked extensively about the risk modification. He is advised to quit smoking. We will give him nicotine gum, as well as Chantix starter pack    Essential hypertension  Blood pressures fairly well-controlled, little high today but says better at home    Claudication [facility])  Right leg 50 % stenosis of SFA , continue statins and aspirin     Dyslipidemia :  All available lab work was reviewed. Patient was advised to repeat lab work before next visit      Counseled extensively and medication compliance urged. We discussed that for the  prevention of ASCVD our  goal is aggressive risk modification. Patient is encouraged to exercise even a brisk walk for 30 minutes  at least 3 to 4 times a week   Various goals were discussed and questions answered. Continue current medications. Appropriate prescriptions are addressed and refills ordered.   Questions answered and patient verbalizes understanding. Call for any problems, questions, or concerns. Continue all other medications of all above medical condition listed as is. No follow-ups on file.

## 2022-12-13 NOTE — PATIENT INSTRUCTIONS
Thank you for allowing us to care for you today! We want to ensure we can follow your treatment plan and we strive to give you the best outcomes and experience possible. If you ever have a life threatening emergency and call 911 - for an ambulance (EMS)   Our providers can only care for you at:   [facility] or [facility]. Even if you have someone take you or you drive yourself we can only care for you in a [facility]. Our providers are not setup at the other healthcare locations! **It is YOUR responsibilty to bring medication bottles and/or updated medication list to [address] [place] [address]. This will allow us to better serve you and all your healthcare needs**  Please be informed that if you contact our office outside of normal business hours the physician on call cannot help with any scheduling or rescheduling issues, procedure instruction questions or any type of medication issue. We advise you for any urgent/emergency that you go to the nearest emergency room!     PLEASE CALL OUR OFFICE DURING NORMAL BUSINESS HOURS    Monday - Friday   8 am to 5 pm    [address]: [phone]    [place]:  [phone]

## 2022-12-13 NOTE — TELEPHONE ENCOUNTER
Attempted times 2 to reach patient ot advise him that the cath lab changed his procedure time from 10:00 to 11:00. Voicemail is full.

## 2022-12-13 NOTE — TELEPHONE ENCOUNTER
----- Message from Mary Anne Hunter MD sent at 12/12/2022  7:16 PM EST -----  See me in office in about 1 to 2 weeks we will plan angiogram    Spoke to pt regarding need for appt. Scheduled appt for today at 1:15.

## 2022-12-14 ENCOUNTER — HOSPITAL ENCOUNTER (OUTPATIENT)
Age: 62
Discharge: HOME | End: 2022-12-14
Payer: COMMERCIAL

## 2022-12-14 ENCOUNTER — HOSPITAL ENCOUNTER (OUTPATIENT)
Dept: GENERAL RADIOLOGY | Age: 62
Discharge: HOME | End: 2022-12-14
Payer: COMMERCIAL

## 2022-12-14 ENCOUNTER — NURSE ONLY (OUTPATIENT)
Dept: CARDIOLOGY CLINIC | Age: 62
End: 2022-12-14

## 2022-12-14 ENCOUNTER — TELEPHONE (OUTPATIENT)
Dept: CARDIOLOGY CLINIC | Age: 62
End: 2022-12-14

## 2022-12-14 DIAGNOSIS — Z01.810 PRE-OPERATIVE CARDIOVASCULAR EXAMINATION: ICD-10-CM

## 2022-12-14 DIAGNOSIS — Z01.810 PRE-OPERATIVE CARDIOVASCULAR EXAMINATION: Primary | ICD-10-CM

## 2022-12-14 LAB
ABO/RH: NORMAL
ANION GAP SERPL CALCULATED.3IONS-SCNC: 11 MMOL/L (ref 4–16)
ANTIBODY SCREEN: NEGATIVE
BASOPHILS ABSOLUTE: 0.1 K/CU MM
BASOPHILS RELATIVE PERCENT: 0.8 % (ref 0–1)
BUN BLDV-MCNC: 15 MG/DL (ref 6–23)
CALCIUM SERPL-MCNC: 9.3 MG/DL (ref 8.3–10.6)
CHLORIDE BLD-SCNC: 101 MMOL/L (ref 99–110)
CO2: 27 MMOL/L (ref 21–32)
COMMENT: NORMAL
CREAT SERPL-MCNC: 0.9 MG/DL (ref 0.9–1.3)
DIFFERENTIAL TYPE: ABNORMAL
EOSINOPHILS ABSOLUTE: 0.3 K/CU MM
EOSINOPHILS RELATIVE PERCENT: 2.9 % (ref 0–3)
GFR SERPL CREATININE-BSD FRML MDRD: >60 ML/MIN/1.73M2
GLUCOSE BLD-MCNC: 114 MG/DL (ref 70–99)
HCT VFR BLD CALC: 50.1 % (ref 42–52)
HEMOGLOBIN: 16.3 GM/DL (ref 13.5–18)
IMMATURE NEUTROPHIL %: 1.1 % (ref 0–0.43)
LYMPHOCYTES ABSOLUTE: 2.8 K/CU MM
LYMPHOCYTES RELATIVE PERCENT: 27.7 % (ref 24–44)
MCH RBC QN AUTO: 32.4 PG (ref 27–31)
MCHC RBC AUTO-ENTMCNC: 32.5 % (ref 32–36)
MCV RBC AUTO: 99.6 FL (ref 78–100)
MONOCYTES ABSOLUTE: 1 K/CU MM
MONOCYTES RELATIVE PERCENT: 9.7 % (ref 0–4)
NUCLEATED RBC %: 0 %
PDW BLD-RTO: 11.5 % (ref 11.7–14.9)
PLATELET # BLD: 220 K/CU MM (ref 140–440)
PMV BLD AUTO: 10.8 FL (ref 7.5–11.1)
POTASSIUM SERPL-SCNC: 4.4 MMOL/L (ref 3.5–5.1)
RBC # BLD: 5.03 M/CU MM (ref 4.6–6.2)
SEGMENTED NEUTROPHILS ABSOLUTE COUNT: 5.7 K/CU MM
SEGMENTED NEUTROPHILS RELATIVE PERCENT: 57.8 % (ref 36–66)
SODIUM BLD-SCNC: 139 MMOL/L (ref 135–145)
TOTAL IMMATURE NEUTOROPHIL: 0.11 K/CU MM
TOTAL NUCLEATED RBC: 0 K/CU MM
WBC # BLD: 9.9 K/CU MM (ref 4–10.5)

## 2022-12-14 PROCEDURE — 36415 COLL VENOUS BLD VENIPUNCTURE: CPT

## 2022-12-14 PROCEDURE — 71046 X-RAY EXAM CHEST 2 VIEWS: CPT

## 2022-12-14 PROCEDURE — 85025 COMPLETE CBC W/AUTO DIFF WBC: CPT

## 2022-12-14 PROCEDURE — 86900 BLOOD TYPING SEROLOGIC ABO: CPT

## 2022-12-14 PROCEDURE — 86850 RBC ANTIBODY SCREEN: CPT

## 2022-12-14 PROCEDURE — 80048 BASIC METABOLIC PNL TOTAL CA: CPT

## 2022-12-14 PROCEDURE — 86901 BLOOD TYPING SEROLOGIC RH(D): CPT

## 2022-12-14 NOTE — PROGRESS NOTES
Patient was here in office & educated on Peripheral angigram for Dx: Claludication. Procedure is scheduled for 12/19/22 @ 11:00, w/arrival @ 9:00, @ [facility]. Pre-admission orders were given to patient for labs & CXR, which are due 12/14/22 @ [facility]. Procedure and risks were explained to patient. Consent forms were signed. Instructions were given to patient to remain NPO after midnight the night before procedure. Patient may take morning meds the morning of procedure with small amount of water. Patient is asked to call hospital @ [phone], 1 to 2 days before procedure to pre-register. Patient was notified that procedure could be delayed due to an emergency. Patient voiced understanding.  Copies of consent, pre-testing orders, & instructions scanned into media

## 2022-12-19 ENCOUNTER — HOSPITAL ENCOUNTER (OUTPATIENT)
Dept: CARDIAC CATH/INVASIVE PROCEDURES | Age: 62
Discharge: HOME | End: 2022-12-19
Attending: INTERNAL MEDICINE | Admitting: INTERNAL MEDICINE
Payer: COMMERCIAL

## 2022-12-19 VITALS
BODY MASS INDEX: 28.63 KG/M2 | HEIGHT: 70 IN | OXYGEN SATURATION: 90 % | DIASTOLIC BLOOD PRESSURE: 76 MMHG | SYSTOLIC BLOOD PRESSURE: 119 MMHG | WEIGHT: 200 LBS | TEMPERATURE: 96.3 F | RESPIRATION RATE: 20 BRPM | HEART RATE: 92 BPM

## 2022-12-19 LAB
ACTIVATED CLOTTING TIME, LOW RANGE: 159 SEC
ACTIVATED CLOTTING TIME, LOW RANGE: 196 SEC
ACTIVATED CLOTTING TIME, LOW RANGE: 205 SEC
ACTIVATED CLOTTING TIME, LOW RANGE: >400 SEC

## 2022-12-19 PROCEDURE — 2709999900 HC NON-CHARGEABLE SUPPLY

## 2022-12-19 PROCEDURE — 2500000003 HC RX 250 WO HCPCS

## 2022-12-19 PROCEDURE — 6360000002 HC RX W HCPCS

## 2022-12-19 PROCEDURE — 85347 COAGULATION TIME ACTIVATED: CPT

## 2022-12-19 PROCEDURE — 2580000003 HC RX 258: Performed by: INTERNAL MEDICINE

## 2022-12-19 PROCEDURE — 6370000000 HC RX 637 (ALT 250 FOR IP)

## 2022-12-19 PROCEDURE — 6370000000 HC RX 637 (ALT 250 FOR IP): Performed by: INTERNAL MEDICINE

## 2022-12-19 PROCEDURE — C1887 CATHETER, GUIDING: HCPCS

## 2022-12-19 PROCEDURE — C1769 GUIDE WIRE: HCPCS

## 2022-12-19 PROCEDURE — C1725 CATH, TRANSLUMIN NON-LASER: HCPCS

## 2022-12-19 PROCEDURE — 75630 X-RAY AORTA LEG ARTERIES: CPT

## 2022-12-19 PROCEDURE — C2623 CATH, TRANSLUMIN, DRUG-COAT: HCPCS

## 2022-12-19 PROCEDURE — C1894 INTRO/SHEATH, NON-LASER: HCPCS

## 2022-12-19 PROCEDURE — 37224 HC FEM POP TERRITORY PLASTY: CPT

## 2022-12-19 PROCEDURE — 6360000004 HC RX CONTRAST MEDICATION

## 2022-12-19 RX ORDER — DIPHENHYDRAMINE HCL 25 MG
25 TABLET ORAL ONCE
Status: COMPLETED | OUTPATIENT
Start: 2022-12-19 | End: 2022-12-19

## 2022-12-19 RX ORDER — SODIUM CHLORIDE 9 MG/ML
INJECTION, SOLUTION INTRAVENOUS PRN
Status: DISCONTINUED | OUTPATIENT
Start: 2022-12-19 | End: 2022-12-19 | Stop reason: HOSPADM

## 2022-12-19 RX ORDER — ACETAMINOPHEN 325 MG/1
650 TABLET ORAL EVERY 4 HOURS PRN
Status: DISCONTINUED | OUTPATIENT
Start: 2022-12-19 | End: 2022-12-19 | Stop reason: HOSPADM

## 2022-12-19 RX ORDER — ASPIRIN 81 MG/1
81 TABLET, CHEWABLE ORAL DAILY
Status: DISCONTINUED | OUTPATIENT
Start: 2022-12-20 | End: 2022-12-19 | Stop reason: HOSPADM

## 2022-12-19 RX ORDER — SODIUM CHLORIDE 9 MG/ML
INJECTION, SOLUTION INTRAVENOUS CONTINUOUS
Status: DISCONTINUED | OUTPATIENT
Start: 2022-12-19 | End: 2022-12-19 | Stop reason: ALTCHOICE

## 2022-12-19 RX ORDER — ONDANSETRON 2 MG/ML
4 INJECTION INTRAMUSCULAR; INTRAVENOUS EVERY 6 HOURS PRN
Status: DISCONTINUED | OUTPATIENT
Start: 2022-12-19 | End: 2022-12-19 | Stop reason: HOSPADM

## 2022-12-19 RX ORDER — SODIUM CHLORIDE 0.9 % (FLUSH) 0.9 %
5-40 SYRINGE (ML) INJECTION EVERY 12 HOURS SCHEDULED
Status: DISCONTINUED | OUTPATIENT
Start: 2022-12-19 | End: 2022-12-19 | Stop reason: HOSPADM

## 2022-12-19 RX ORDER — DIAZEPAM 5 MG/1
5 TABLET ORAL ONCE
Status: COMPLETED | OUTPATIENT
Start: 2022-12-19 | End: 2022-12-19

## 2022-12-19 RX ORDER — SODIUM CHLORIDE 9 MG/ML
INJECTION, SOLUTION INTRAVENOUS CONTINUOUS
Status: DISCONTINUED | OUTPATIENT
Start: 2022-12-19 | End: 2022-12-19 | Stop reason: HOSPADM

## 2022-12-19 RX ORDER — CLOPIDOGREL BISULFATE 75 MG/1
75 TABLET ORAL DAILY
Qty: 90 TABLET | Refills: 1 | Status: SHIPPED | OUTPATIENT
Start: 2022-12-19

## 2022-12-19 RX ORDER — CILOSTAZOL 100 MG/1
100 TABLET ORAL 2 TIMES DAILY
Qty: 90 TABLET | Refills: 4 | Status: SHIPPED | OUTPATIENT
Start: 2022-12-19

## 2022-12-19 RX ORDER — SODIUM CHLORIDE 0.9 % (FLUSH) 0.9 %
5-40 SYRINGE (ML) INJECTION PRN
Status: DISCONTINUED | OUTPATIENT
Start: 2022-12-19 | End: 2022-12-19 | Stop reason: HOSPADM

## 2022-12-19 RX ORDER — CLOPIDOGREL BISULFATE 75 MG/1
75 TABLET ORAL DAILY
Status: DISCONTINUED | OUTPATIENT
Start: 2022-12-20 | End: 2022-12-19 | Stop reason: HOSPADM

## 2022-12-19 RX ADMIN — SODIUM CHLORIDE: 9 INJECTION, SOLUTION INTRAVENOUS at 09:52

## 2022-12-19 RX ADMIN — DIAZEPAM 5 MG: 5 TABLET ORAL at 09:52

## 2022-12-19 RX ADMIN — DIPHENHYDRAMINE HYDROCHLORIDE 25 MG: 25 TABLET ORAL at 09:52

## 2022-12-19 RX ADMIN — ACETAMINOPHEN 650 MG: 325 TABLET ORAL at 16:07

## 2022-12-19 NOTE — H&P
Mayra Stewart, 63 y.o., male    Primary care physician:  Vinicio Navarro MD     Chief Complaint: Chest Pain    History of Present Illness:  . He says he is having a lot of pain in his right leg when he walks limiting his work , pletal has not helped  Arterial doppler shows 100 % occluded SFA on right side   HE stopped taking lipitor on his own   Today his ekg shows sinus tachycardia   Arterial doppler  doppler 12/9/2022     The Right Proximal and mid SFA exhibits an occlusion. No evidence of significant occlusive arterial disease in the left lower    extremity. Right ABIs show Moderate peripheral arterial disease, at rest.    The Left ADELITA shows Mild peripheral arterial disease. Past medical history:    has a past medical history of H/O cardiovascular stress test, H/O Doppler abd aorta ultrasound, H/O Doppler lower arterial ultrasound, H/O echocardiogram, Hypertension, MRSA (methicillin resistant Staphylococcus aureus), Neck problem, Pneumonia, Shortness of breath on exertion, and Tinnitus of both ears. Past surgical history:   has a past surgical history that includes Colonoscopy (2010); other surgical history; Finger surgery (1976); Vasectomy (1999); Tonsillectomy (1968); Shoulder arthroscopy (2000's); Carpal tunnel release (2005); Inguinal hernia repair (09/11/2012); Cataract removal; Shoulder Debridement (Right, 09/20/2018); and Colonoscopy (N/A, 11/7/2022). Social History:   reports that he has been smoking cigarettes. He has a 17.00 pack-year smoking history. His smokeless tobacco use includes chew. He reports that he does not drink alcohol and does not use drugs. Family history:  family history includes Cancer in his mother and sister; Elevated Lipids in his father and mother; Heart Disease in his father and mother; Hypertension in his father and mother; Mental Illness in his mother; Migraines in his brother; Other in his brother and father;  Thyroid Disease in his sister. Allergies:    No Known Allergies    Home Medications:  Prior to Admission medications    Medication Sig Start Date End Date Taking? Authorizing Provider   aspirin EC 81 MG EC tablet Take 1 tablet by mouth daily 12/13/22   Audrey Mendez MD   metoprolol tartrate (LOPRESSOR) 25 MG tablet Take 1 tablet by mouth 2 times daily 12/13/22   Audrey Mendez MD   aspirin EC 81 MG EC tablet Take 1 tablet by mouth daily 12/13/22   Audrey Mendez MD   cilostazol (PLETAL) 50 MG tablet Take 1 tablet by mouth 2 times daily 12/8/22   Audrey Mendez MD   magnesium oxide (MAG-OX) 400 MG tablet Take 1 tablet by mouth daily 12/8/22   Audrey Mendez MD   NONFORMULARY Patient states' he uses something called poppers for sexual arousement\"    Historical Provider, MD   ibuprofen (ADVIL;MOTRIN) 200 MG tablet Take 200 mg by mouth every 6 hours as needed for Pain    Historical Provider, MD   Omega-3 Fatty Acids (FISH OIL) 1000 MG capsule Take by mouth daily  Patient not taking: No sig reported    Historical Provider, MD   vitamin C (ASCORBIC ACID) 500 MG tablet Take 1,000 mg by mouth daily  Patient not taking: No sig reported    Historical Provider, MD   Cyanocobalamin (VITAMIN B 12) 500 MCG TABS Take 1,000 mg by mouth    Historical Provider, MD   Cetirizine HCl (ZYRTEC ALLERGY PO) Take by mouth    Historical Provider, MD   bisacodyl 5 MG EC tablet Take 4 tablets once for colonoscopy prep 10/11/22   Joelle Vásquez, APRN - CNP   varenicline (CHANTIX STARTING MONTH FILOMENA) 0.5 MG X 11 & 1 MG X 42 tablet Take by mouth.   Patient not taking: No sig reported 9/27/22   Thalia Johnson MD   varenicline (CHANTIX CONTINUING MONTH PAK) 1 MG tablet Take 1 tablet by mouth 2 times daily  Patient not taking: No sig reported 9/27/22   Thalia Johnson MD   lisinopril (PRINIVIL;ZESTRIL) 10 MG tablet Take 1 tablet by mouth daily 9/27/22   Thalia Johnson MD   atorvastatin (LIPITOR) 80 MG tablet Take 1 tablet by mouth daily 9/27/22 Ellen Torres MD   Acetaminophen (TYLENOL) 325 MG CAPS Take by mouth    Historical Provider, MD       Review of systems: Review of Systems:   Constitutional: No Fever or Weight Loss   Eyes: No Decreased Vision  ENT: No Headaches, Hearing Loss or Vertigo  Cardiovascular: No chest pain, dyspnea on exertion, palpitations or loss of consciousness  Respiratory: No cough or wheezing    Gastrointestinal: No abdominal pain, appetite loss, blood in stools, constipation, diarrhea or heartburn  Genitourinary: No dysuria, trouble voiding, or hematuria  Musculoskeletal:  No gait disturbance, weakness or joint complaints  Integumentary: No rash or pruritis  Neurological: No TIA or stroke symptoms  Psychiatric: No anxiety or depression  Endocrine: No malaise, fatigue or temperature intolerance  Hematologic/Lymphatic: No bleeding problems, blood clots or swollen lymph nodes  Allergic/Immunologic: No nasal congestion or hives    Physical Examination:    There were no vitals taken for this visit. General Appearance:  No distress, conversant  Constitutional:  Well developed, Well nourished, No acute distress, Non-toxic appearance. HENT:  Normocephalic, Atraumatic, Bilateral external ears normal, Oropharynx moist, No oral exudates, Nose normal. Neck- Normal range of motion, No tenderness, Supple, No stridor,no apical-carotid delay  Eyes:  PERRL, EOMI, Conjunctiva normal, No discharge. Lymphatics: no palpable lymph nodes  Respiratory:  Normal breath sounds, No respiratory distress, No wheezing, No chest tenderness. ,no uise of accessory muscles, JVP not elevated  Cardiovascular: (PMI) apex non displaced,no lifts no thrills, no s3,no s4, Normal heart rate, Normal rhythm, No murmurs, No rubs, No gallops. GI:  Bowel sounds normal, Soft, No tenderness, No masses, No pulsatile masses, no hepatosplenomegally, no bruits  Musculoskeletal:  Intact distal pulses, No edema, No tenderness, No cyanosis, No clubbing.  Good range of motion in all major joints. No tenderness to palpation or major deformities noted. Back- No tenderness. Integument:  Warm, Dry, No erythema, No rash. Skin: no rash, no ulcers  Lymphatic:  No lymphadenopathy noted. Neurologic:  Alert & oriented x 3, Normal motor function, Normal sensory function, No focal deficits noted. Lab Review   No results for input(s): WBC, HGB, HCT, PLT in the last 72 hours. No results for input(s): NA, K, CL, CO2, PHOS, BUN, CREATININE, CA in the last 72 hours. No results for input(s): AST, ALT, ALB, BILIDIR, BILITOT, ALKPHOS in the last 72 hours. No results for input(s): TROPONINI in the last 72 hours. No results found for: INR, PROTIME  No results found for: BNP     PVD  Continue pletal and plan angiogram to evaluate for right sided sfa occlusion          ASA : 2 , Mallampati class II  Plan: Alternate and risks versus benefits were discussed in detail. We will plan peripheral angiogram, left femoral access  with run off  We  discussed the risk of but not limited to  potential kidney failure, emergent surgery,blood transfusion  transfusion, infection, potential even death.   Patient is in agreement and wants to proceed

## 2022-12-19 NOTE — FLOWSHEET NOTE
12/19/22 1447   Puncture Site Assessment 1   Location Femoral - left   Site Assessment No redness, drainage, swelling or hematoma   Dressing Applied Transparent occlusive dressing   Hemostasis achieved to left groin.  Site dressed with 4x4 and tegaderm

## 2022-12-19 NOTE — PLAN OF CARE
All discharge instructions explained to the patient at this time. No bleeding or hematoma noted along site. Patient walked to the bathroom without difficulty and IV removed per discharge orders. Patient denies any additional needs and all vitals stable for discharge home.   [address] [place] [address] 12/19/2022

## 2022-12-19 NOTE — FLOWSHEET NOTE
#6x25 LFA sheath pulled at this time per [facility]. Manual pressure initiated and held until hemostasis achieved.  Pt tolerated well

## 2022-12-19 NOTE — FLOWSHEET NOTE
Pt c/o mild aching sensation in right leg. Foot remains warm and dry and doppler pulses remain present.  Pt repositioned for increased comfort and PO Tylenol given

## 2022-12-19 NOTE — DISCHARGE INSTRUCTIONS
Discharge Home Instuctions  Name:Nicolas Jerry  RZM:[id number]    Your instructions:    Shower only for the first 5 days, NO TUB BATHS. Wash procedure site with mild soap, rinse and pat dry. Do not rub over the procedure site for two (2) days. Remove dressing and replace with a band-aid in 2 days. Site observations-Observe the area for bleeding or hematoma (lump under the skin caused by bleeding.)      A. Painless bruising is normal.  B. If a firmness/swelling seems to be increasing or there is bright red bleeding from site       (hold pressure over procedure site) and  CALL 911 IMMEDIATELY. What to do after you leave the hospital:    Recommended activity: no lifting, Driving, or Strenuous exercise for 3 days. DO NOT lift more than 10lbs. For your procedure you may have been given a sedative to help you relax. This drug will make you sleepy. It is usually given in a vein (by IV). Don't do anything for 24 hours that requires attention to detail. It takes time for the medicine effects to completely wear off. Do not sign any legally binding contracts or documents over the next 24 hours. For your safety, you should not drive or operate any machinery that could be dangerous until the medicine wears off and you can think clearly and react easily. Follow-up with physician in 7-10 days. Take your medication as ordered.       If you have any questions, you may call [phone] or your physicians office

## 2022-12-20 ENCOUNTER — TELEPHONE (OUTPATIENT)
Dept: CARDIOLOGY CLINIC | Age: 62
End: 2022-12-20

## 2022-12-20 DIAGNOSIS — I25.10 ASCVD (ARTERIOSCLEROTIC CARDIOVASCULAR DISEASE): ICD-10-CM

## 2022-12-20 DIAGNOSIS — I73.9 PVD (PERIPHERAL VASCULAR DISEASE) (HCC): Primary | ICD-10-CM

## 2022-12-20 NOTE — TELEPHONE ENCOUNTER
----- Message from Audrey Mendez MD sent at 12/19/2022  7:13 PM EST -----  Get arterial doppler on right leg  in 2 to 3 weeks and then see me     Spoke to pt regarding scheduling arterial doppler (R) leg and follow up appt. Pt sched for doppler on 01/10/23 @ 8am, follow up appt on01/13/23 11:15am in [place].

## 2022-12-22 ENCOUNTER — TELEPHONE (OUTPATIENT)
Dept: CARDIOLOGY CLINIC | Age: 62
End: 2022-12-22

## 2023-01-10 ENCOUNTER — PROCEDURE VISIT (OUTPATIENT)
Dept: CARDIOLOGY CLINIC | Age: 63
End: 2023-01-10
Payer: COMMERCIAL

## 2023-01-10 DIAGNOSIS — I25.10 ASCVD (ARTERIOSCLEROTIC CARDIOVASCULAR DISEASE): ICD-10-CM

## 2023-01-10 DIAGNOSIS — I73.9 PVD (PERIPHERAL VASCULAR DISEASE) (HCC): ICD-10-CM

## 2023-01-10 PROCEDURE — 93926 LOWER EXTREMITY STUDY: CPT | Performed by: INTERNAL MEDICINE

## 2023-01-10 PROCEDURE — 93922 UPR/L XTREMITY ART 2 LEVELS: CPT | Performed by: INTERNAL MEDICINE

## 2023-01-13 ENCOUNTER — OFFICE VISIT (OUTPATIENT)
Dept: CARDIOLOGY CLINIC | Age: 63
End: 2023-01-13
Payer: COMMERCIAL

## 2023-01-13 VITALS
HEART RATE: 96 BPM | SYSTOLIC BLOOD PRESSURE: 128 MMHG | BODY MASS INDEX: 29.2 KG/M2 | WEIGHT: 204 LBS | HEIGHT: 70 IN | DIASTOLIC BLOOD PRESSURE: 74 MMHG

## 2023-01-13 DIAGNOSIS — I10 ESSENTIAL HYPERTENSION: ICD-10-CM

## 2023-01-13 DIAGNOSIS — I25.10 ASCVD (ARTERIOSCLEROTIC CARDIOVASCULAR DISEASE): Primary | ICD-10-CM

## 2023-01-13 DIAGNOSIS — Z72.0 TOBACCO USE: ICD-10-CM

## 2023-01-13 DIAGNOSIS — Z72.0 TOBACCO CHEW USE: ICD-10-CM

## 2023-01-13 DIAGNOSIS — I73.9 PERIPHERAL VASCULAR DISEASE, UNSPECIFIED (HCC): ICD-10-CM

## 2023-01-13 PROCEDURE — 3074F SYST BP LT 130 MM HG: CPT | Performed by: INTERNAL MEDICINE

## 2023-01-13 PROCEDURE — 99214 OFFICE O/P EST MOD 30 MIN: CPT | Performed by: INTERNAL MEDICINE

## 2023-01-13 PROCEDURE — 3078F DIAST BP <80 MM HG: CPT | Performed by: INTERNAL MEDICINE

## 2023-01-13 RX ORDER — CILOSTAZOL 100 MG/1
100 TABLET ORAL 2 TIMES DAILY
Qty: 180 TABLET | Refills: 3 | Status: SHIPPED | OUTPATIENT
Start: 2023-01-13

## 2023-01-13 RX ORDER — ATORVASTATIN CALCIUM 80 MG/1
80 TABLET, FILM COATED ORAL DAILY
Qty: 90 TABLET | Refills: 1 | Status: SHIPPED | OUTPATIENT
Start: 2023-01-13

## 2023-01-13 NOTE — PROGRESS NOTES
CARDIOLOGY  NOTE                    Chief Complaint: High calcium score /PVD     HPI:   Yonatan Acuña is a 63y.o. year old who has history as noted below. .  He says he right leg is much better after PCi to 100 % occluded Right SFA in Dec 2022  Today his ekg shows sinus tachycardia    He underwent screening with a cardiac CT showing calcium score of 1949.5 . Stress test in 2021  shows no ischemia. 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 He does feel lightheaded and dizzy once in a while. He denies any chest pain but he has noted some shortness of breath especially when he is exerting. Once in a while he feels he gest tired and legs get weak when he returns from work  Unfortunately he smokes a  Half to full pack a day and also chews tobacco.HE is trying to cut down   Father had heart problems in his 46s, requiring multiple surgeries bypass and peripheral interventions. Mother also had heart problems. Johnie Carr   He drives a snow removal of truck for [employer]       Current Outpatient Medications   Medication Sig Dispense Refill    cilostazol (PLETAL) 100 MG tablet Take 1 tablet by mouth 2 times daily 90 tablet 4    clopidogrel (PLAVIX) 75 MG tablet Take 1 tablet by mouth daily 90 tablet 1    aspirin EC 81 MG EC tablet Take 1 tablet by mouth daily 90 tablet 3    magnesium oxide (MAG-OX) 400 MG tablet Take 1 tablet by mouth daily 30 tablet 1    ibuprofen (ADVIL;MOTRIN) 200 MG tablet Take 200 mg by mouth every 6 hours as needed for Pain      Cetirizine HCl (ZYRTEC ALLERGY PO) Take by mouth      lisinopril (PRINIVIL;ZESTRIL) 10 MG tablet Take 1 tablet by mouth daily 90 tablet 1    atorvastatin (LIPITOR) 80 MG tablet Take 1 tablet by mouth daily 90 tablet 1    Acetaminophen (TYLENOL) 325 MG CAPS Take by mouth      metoprolol tartrate (LOPRESSOR) 25 MG tablet Take 1 tablet by mouth 2 times daily (Patient not taking: Reported on 1/13/2023) 60 tablet 5    NONFORMULARY Patient states' he uses something called poppers for sexual arousement\" (Patient not taking: Reported on 1/13/2023)      Omega-3 Fatty Acids (FISH OIL) 1000 MG capsule Take by mouth daily (Patient not taking: Reported on 1/13/2023)      vitamin C (ASCORBIC ACID) 500 MG tablet Take 1,000 mg by mouth daily (Patient not taking: Reported on 1/13/2023)      Cyanocobalamin (VITAMIN B 12) 500 MCG TABS Take 1,000 mg by mouth (Patient not taking: Reported on 1/13/2023)      varenicline (CHANTIX STARTING MONTH FILOMENA) 0.5 MG X 11 & 1 MG X 42 tablet Take by mouth. (Patient not taking: Reported on 1/13/2023) 53 tablet 0    varenicline (CHANTIX CONTINUING MONTH FILOMENA) 1 MG tablet Take 1 tablet by mouth 2 times daily (Patient not taking: Reported on 1/13/2023) 60 tablet 1     No current facility-administered medications for this visit. Allergies:   Patient has no known allergies. Patient History:  Past Medical History:   Diagnosis Date    H/O cardiovascular stress test 03/19/2019    Normal study. H/O Doppler abd aorta ultrasound 03/19/2019    No evidence of AAA within the visualized portions of the abdominal aorta. H/O Doppler lower arterial ultrasound 03/18/2019    R SFA 20-49% stenosis, Right ABIs show Mild peripheral arterial disease.     H/O echocardiogram 03/18/2019    EF 55-60%, WNL    H/O peripheral angiogram 12/19/2022    PCI - SFA    Hypertension     MRSA (methicillin resistant Staphylococcus aureus) \"2010 And Early 2012\"    \"Butt Twice\"    Neck problem     \"Get Adjusted As Needed  Sees Dr. Radha Bronson" disc problems    Pneumonia \"As A Child\"    No Current Symtoms    Shortness of breath on exertion     Tinnitus of both ears      Past Surgical History:   Procedure Laterality Date    CARPAL TUNNEL RELEASE  2005    Right    CATARACT REMOVAL      bid    COLONOSCOPY  2010    due 2020    COLONOSCOPY N/A 11/7/2022    COLONOSCOPY POLYPECTOMY SNARE/COLD BIOPSY performed by Evaristo Carroll MD at [address] 1976    Reattached Right Little Finger Due To Table Saw Accident    INGUINAL HERNIA REPAIR  09/11/2012    Left     OTHER SURGICAL HISTORY      Family Physician Is Dr. Hoang Weems In [place], [place],  cauterization on right nasal passage.    SHOULDER ARTHROSCOPY  2000's    Left     SHOULDER DEBRIDEMENT Right 09/20/2018    TONSILLECTOMY  1968    VASECTOMY  1999     Family History   Problem Relation Age of Onset    Heart Disease Mother     Hypertension Mother     Elevated Lipids Mother     Cancer Mother         \"Breast Cancer\"    Mental Illness Mother         \"Anxiety\"    Cancer Sister         \"Thyroid Cancer\"    Hypertension Father     Elevated Lipids Father     Other Father         \"Stents In Legs\"    Heart Disease Father         \"Heart Stent\"    Other Brother         \"Migraines\"    Migraines Brother     Thyroid Disease Sister      Social History     Tobacco Use    Smoking status: Every Day     Packs/day: 0.50     Years: 34.00     Pack years: 17.00     Types: Cigarettes     Last attempt to quit: 1/7/2013     Years since quitting: 10.0    Smokeless tobacco: Current     Types: Chew    Tobacco comments:     Ask patient not to chew tobacco 24-48- hours before procedure.   Substance Use Topics    Alcohol use: No     Alcohol/week: 0.0 standard drinks        Review of Systems:   Constitutional: No Fever or Weight Loss   Eyes: No Decreased Vision  ENT: No Headaches, Hearing Loss or Vertigo  Cardiovascular: as per note above   Respiratory: No cough or wheezing and as per note above.   Gastrointestinal: No abdominal pain, appetite loss, blood in stools, constipation, diarrhea or heartburn  Genitourinary: No dysuria, trouble voiding, or hematuria  Musculoskeletal:  None  Integumentary: No rash or pruritis  Neurological: No TIA or stroke symptoms  Psychiatric: No anxiety or depression  Endocrine: No malaise, fatigue or temperature intolerance  Hematologic/Lymphatic: No bleeding problems, blood clots or swollen lymph  nodes  Allergic/Immunologic: No nasal congestion or hives    Objective:      Physical Exam:  /74 (Site: Right Upper Arm, Position: Sitting, Cuff Size: Medium Adult)   Pulse 96   Ht 5' 10\" (1.778 m)   Wt 204 lb (92.5 kg)   BMI 29.27 kg/m²   Wt Readings from Last 3 Encounters:   01/13/23 204 lb (92.5 kg)   12/19/22 200 lb (90.7 kg)   12/13/22 199 lb 9.6 oz (90.5 kg)     Body mass index is 29.27 kg/m². Vitals:    01/13/23 1129   BP: 128/74   Pulse: 96      General Appearance:  No distress, conversant  Constitutional:  Well developed, Well nourished, No acute distress, Non-toxic appearance. HENT:  Normocephalic, Atraumatic, Bilateral external ears normal, Oropharynx moist, No oral exudates, Nose normal. Neck- Normal range of motion, No tenderness, Supple, No stridor,no apical-carotid delay  Eyes:  PERRL, EOMI, Conjunctiva normal, No discharge. Respiratory:  Normal breath sounds, No respiratory distress, No wheezing, No chest tenderness. ,no use of accessory muscles, NO crackles  Cardiovascular: (PMI) apex non displaced,no lifts no thrills,S1 and S2 audible, No added heart sounds, No signs of ankle edema, or volume overload, No evidence of JVD, No crackles  GI:  Bowel sounds normal, Soft, No tenderness, No masses, No gross visceromegaly   :  No costovertebral angle tenderness   Musculoskeletal:  No edema, no tenderness, no deformities.  Back- no tenderness  Integument:  Well hydrated, no rash   Lymphatic:  No lymphadenopathy noted   Neurologic:  Alert & oriented x 3, CN 2-12 normal, normal motor function, normal sensory function, no focal deficits noted   Psychiatric:  Speech and behavior appropriate               Medical decision making and Data review:  DATA:  No results found for: TROPONINT  BNP:  No results found for: PROBNP  PT/INR:  No results found for: PTINR  No results found for: LABA1C  Lab Results   Component Value Date    CHOL 211 (H) 09/27/2022    TRIG 318 (H) 09/27/2022    HDL 45 09/27/2022 LDLCALC 102 09/27/2022    LDLDIRECT 117 (H) 06/10/2015     Lab Results   Component Value Date    ALT 48 09/27/2022    AST 26 09/27/2022     TSH: No results found for: TSH  Lab Results   Component Value Date    AST 26 09/27/2022    ALT 48 09/27/2022    BILITOT 0.6 09/27/2022    ALKPHOS 60 09/27/2022     No results found for: PROBNP  No results found for: LABA1C  Lab Results   Component Value Date    WBC 9.9 12/14/2022    HGB 16.3 12/14/2022    HCT 50.1 12/14/2022     12/14/2022      Stress  3/19/19       Resting ECG  nsr    Resting HR:82 bpm  Resting BP:132/76 mmHg   Stress Protocol:Exercise - Bjorn    Peak HR:150 bpm                         HR response: Appropriate  Peak BP:172/86 mmHg                     BP response: Normal resting BP -  Predicted HR: 164 bpm                   appropriate response  % of predicted HR: 91                   HR/BP product:72047                                         Max exrecise: 11 METS  Exercise duration: 09:59 min  Reason for termination:Target heart           Summary    Supervising physician Dr. Bert Naqvi . normal stress test, Normal tracer uptake    in all myocardial segment during rest and stress. Diaphragmatic attenuation    artifact noted. normal LVEF           Echo 3/18/19   Summary   LV function and size are normal, Ejection Fraction 55-60 %. Normal left ventricular wall thickness. No regional wall motion abnormalities were detected. Diastolic Dysfunction Grade I . All chamber dimensions are within normal limits. No significant valvular disease noted. RVSP= 28 mmHg. No evidence of pericardial effusion. Arterial doppler 3/18/19    Summary        The Right distal SFA exhibits 20-49% stenosis . The Right Distal SFA exhibits calcific plaquing. No focal stenosis noted in the arteries of the left lower extremity. Bilateral lower extremity arteries exhibit triphasic waveforms. Right ABIs show Mild peripheral arterial disease.     The Left ADELITA shows normal arterial flow. March 2019   No evidence of AAA within the visualized portions of the abdominal aorta. Arterial doppler 12/9/2022    The Right Proximal and mid SFA exhibits an occlusion. No evidence of significant occlusive arterial disease in the left lower    extremity. Right ABIs show Moderate peripheral arterial disease, at rest.    The Left ADELITA shows Mild peripheral arterial disease. All labs, medications and tests reviewed by myself including data and history from outside source , patient and available family . Assessment & Plan:      1. ASCVD (arteriosclerotic cardiovascular disease)    2. Essential hypertension    3. Peripheral vascular disease, unspecified ([address]    4. Tobacco chew use    5. Tobacco use           ASCVD (arteriosclerotic cardiovascular disease)  Calcium score of 1948. Start aspirin. Cardiolite  in 2021 shows no  ischemia. He should also be screened for AAA, given his history of tobacco abuse and family history of aortic aneurysm. Increase stains to 80 mg   aspirin and   He had no AAA  On screening  In march 2019   Start metoprolol 25 mg bid due to tachycardia, asked to take apirin 81 mg daily     PVD  Continue pletal s/p PCI to  right sided sfa occlusion improved to 0%  continue plavix , aspirin and pletal encourage to walk , lipitor 80 mg daily       Tobacco use  We talked extensively about the risk modification. He is advised to quit smoking. We will give him nicotine gum, as well as Chantix starter pack    Essential hypertension  Blood pressures fairly well-controlled, little high today but says better at home       Dyslipidemia :  All available lab work was reviewed. Patient was advised to repeat lab work before next visit      Counseled extensively and medication compliance urged. We discussed that for the  prevention of ASCVD our  goal is aggressive risk modification. Patient is encouraged to exercise even a brisk walk for 30 minutes at least 3 to 4 times a week   Various goals were discussed and questions answered. Continue current medications. Appropriate prescriptions are addressed and refills ordered. Questions answered and patient verbalizes understanding. Call for any problems, questions, or concerns. Continue all other medications of all above medical condition listed as is. Return in about 6 months (around 7/13/2023).

## 2023-01-13 NOTE — PATIENT INSTRUCTIONS
Please be informed that if you contact our office outside of normal business hours the physician on call cannot help with any scheduling or rescheduling issues, procedure instruction questions or any type of medication issue. We advise you for any urgent/emergency that you go to the nearest emergency room! PLEASE CALL OUR OFFICE DURING NORMAL BUSINESS HOURS    Monday - Friday   8 am to 5 pm    [place] [place]: [phone]    [place]:  [phone]  **It is YOUR responsibilty to bring medication bottles and/or updated medication list to [address] [place] [address]. This will allow us to better serve you and all your healthcare needs**  [facility] Locations - No appointment necessary. Sites open Monday to Friday. Call your preferred location for test preparation, business hours and other information you need. [employer] accepts [employer]. [place] [facility] [address] [place], [address] [facility]  Phone: [phone] [facility] [address] [facility]  [address]. [facility], [address] [facility]  Phone: [phone]     Thank you for allowing us to care for you today! We want to ensure we can follow your treatment plan and we strive to give you the best outcomes and experience possible. If you ever have a life threatening emergency and call 911 - for an ambulance (EMS)   Our providers can only care for you at:   [facility] or [facility]. Even if you have someone take you or you drive yourself we can only care for you in a [facility] facility. Our providers are not setup at the other healthcare locations!

## 2023-01-20 ENCOUNTER — TELEPHONE (OUTPATIENT)
Dept: CARDIOLOGY CLINIC | Age: 63
End: 2023-01-20

## 2023-01-20 NOTE — TELEPHONE ENCOUNTER
Summary        Bilateral ABIs show Mild peripheral arterial disease. Diffuse heterogeneous atherosclerotic plaque noted in the right proximal,    mid, and distal SFA without evidence hemodynamically significant stenosis at    this time. Left msg on voicemail for pt to return call regarding results of VL LE nino    Pt had follow up appt 01/13/2023.

## 2023-01-26 PROBLEM — R93.89: Status: ACTIVE | Noted: 2022-12-19

## 2023-02-09 RX ORDER — LANOLIN ALCOHOL/MO/W.PET/CERES
400 CREAM (GRAM) TOPICAL DAILY
Qty: 30 TABLET | Refills: 5 | Status: SHIPPED | OUTPATIENT
Start: 2023-02-09

## 2023-06-04 DIAGNOSIS — I10 ESSENTIAL HYPERTENSION: ICD-10-CM

## 2023-06-07 RX ORDER — LISINOPRIL 10 MG/1
TABLET ORAL
Qty: 90 TABLET | Refills: 1 | OUTPATIENT
Start: 2023-06-07

## 2023-06-14 NOTE — TELEPHONE ENCOUNTER
----- Message from Riya Meryl sent at 7/8/2022  4:28 PM EDT -----  Subject: Message to Provider    QUESTIONS  Information for Provider? Patient is wanting to the office to know that if   anything comes up before his 7/26 appt he would like to come in sooner if   possible  ---------------------------------------------------------------------------  --------------  [address] [employer]  [phone]; OK to leave message on voicemail  ---------------------------------------------------------------------------  --------------  SCRIPT ANSWERS  Relationship to Patient?  Self
Spoke with patient no sooner appointment asked to be put on the call list if someone cancels
Holding RN to OR RN

## 2023-06-20 RX ORDER — CLOPIDOGREL BISULFATE 75 MG/1
75 TABLET ORAL DAILY
Qty: 90 TABLET | Refills: 1 | Status: SHIPPED | OUTPATIENT
Start: 2023-06-20

## 2023-08-02 ENCOUNTER — HOSPITAL ENCOUNTER (OUTPATIENT)
Age: 63
Discharge: HOME | End: 2023-08-02
Payer: COMMERCIAL

## 2023-08-02 ENCOUNTER — HOSPITAL ENCOUNTER (OUTPATIENT)
Dept: GENERAL RADIOLOGY | Age: 63
Discharge: HOME | End: 2023-08-02
Payer: COMMERCIAL

## 2023-08-02 ENCOUNTER — OFFICE VISIT (OUTPATIENT)
Dept: FAMILY MEDICINE CLINIC | Age: 63
End: 2023-08-02
Payer: COMMERCIAL

## 2023-08-02 VITALS
WEIGHT: 205.4 LBS | HEART RATE: 103 BPM | TEMPERATURE: 97.7 F | DIASTOLIC BLOOD PRESSURE: 74 MMHG | SYSTOLIC BLOOD PRESSURE: 128 MMHG | BODY MASS INDEX: 29.47 KG/M2 | OXYGEN SATURATION: 94 %

## 2023-08-02 DIAGNOSIS — M54.2 NECK PAIN: Primary | ICD-10-CM

## 2023-08-02 DIAGNOSIS — M54.2 NECK PAIN: ICD-10-CM

## 2023-08-02 PROCEDURE — 3078F DIAST BP <80 MM HG: CPT | Performed by: FAMILY MEDICINE

## 2023-08-02 PROCEDURE — 99213 OFFICE O/P EST LOW 20 MIN: CPT | Performed by: FAMILY MEDICINE

## 2023-08-02 PROCEDURE — 3074F SYST BP LT 130 MM HG: CPT | Performed by: FAMILY MEDICINE

## 2023-08-02 PROCEDURE — 72040 X-RAY EXAM NECK SPINE 2-3 VW: CPT

## 2023-08-02 RX ORDER — NAPROXEN 500 MG/1
500 TABLET ORAL 2 TIMES DAILY WITH MEALS
Qty: 20 TABLET | Refills: 0 | Status: SHIPPED | OUTPATIENT
Start: 2023-08-02

## 2023-08-02 RX ORDER — ORPHENADRINE CITRATE 100 MG/1
100 TABLET, EXTENDED RELEASE ORAL 2 TIMES DAILY
Qty: 20 TABLET | Refills: 0 | Status: SHIPPED | OUTPATIENT
Start: 2023-08-02 | End: 2023-08-12

## 2023-08-02 ASSESSMENT — PATIENT HEALTH QUESTIONNAIRE - PHQ9
SUM OF ALL RESPONSES TO PHQ QUESTIONS 1-9: 0
1. LITTLE INTEREST OR PLEASURE IN DOING THINGS: 0
2. FEELING DOWN, DEPRESSED OR HOPELESS: 0
SUM OF ALL RESPONSES TO PHQ QUESTIONS 1-9: 0
SUM OF ALL RESPONSES TO PHQ9 QUESTIONS 1 & 2: 0
SUM OF ALL RESPONSES TO PHQ QUESTIONS 1-9: 0
SUM OF ALL RESPONSES TO PHQ QUESTIONS 1-9: 0

## 2023-09-12 DIAGNOSIS — M54.2 NECK PAIN: ICD-10-CM

## 2023-09-13 RX ORDER — NAPROXEN 500 MG/1
500 TABLET ORAL 2 TIMES DAILY WITH MEALS
Qty: 60 TABLET | Refills: 0 | Status: SHIPPED | OUTPATIENT
Start: 2023-09-13

## 2023-09-15 ENCOUNTER — TELEPHONE (OUTPATIENT)
Dept: FAMILY MEDICINE CLINIC | Age: 63
End: 2023-09-15

## 2023-09-15 ENCOUNTER — OFFICE VISIT (OUTPATIENT)
Dept: CARDIOLOGY CLINIC | Age: 63
End: 2023-09-15

## 2023-09-15 VITALS
SYSTOLIC BLOOD PRESSURE: 116 MMHG | BODY MASS INDEX: 28.56 KG/M2 | DIASTOLIC BLOOD PRESSURE: 74 MMHG | HEART RATE: 76 BPM | WEIGHT: 204 LBS | HEIGHT: 71 IN

## 2023-09-15 DIAGNOSIS — Z72.0 TOBACCO USE: ICD-10-CM

## 2023-09-15 DIAGNOSIS — I73.9 PERIPHERAL VASCULAR DISEASE, UNSPECIFIED (HCC): ICD-10-CM

## 2023-09-15 DIAGNOSIS — R93.1 ELEVATED CORONARY ARTERY CALCIUM SCORE: ICD-10-CM

## 2023-09-15 DIAGNOSIS — Z72.0 TOBACCO CHEW USE: Primary | ICD-10-CM

## 2023-09-15 DIAGNOSIS — M25.519 SHOULDER PAIN, UNSPECIFIED CHRONICITY, UNSPECIFIED LATERALITY: ICD-10-CM

## 2023-09-15 DIAGNOSIS — I10 PRIMARY HYPERTENSION: ICD-10-CM

## 2023-09-15 DIAGNOSIS — I25.10 ASCVD (ARTERIOSCLEROTIC CARDIOVASCULAR DISEASE): ICD-10-CM

## 2023-09-15 RX ORDER — LIDOCAINE 50 MG/G
1 PATCH TOPICAL DAILY
Qty: 60 PATCH | Refills: 0 | Status: SHIPPED | OUTPATIENT
Start: 2023-09-15

## 2023-09-15 NOTE — TELEPHONE ENCOUNTER
Pt has an xray of cervical spine in August. Pt would like to have that MRI of the neck that is recommended.

## 2023-09-15 NOTE — PROGRESS NOTES
Date    AST 26 06/13/2023    ALT 47 (H) 06/13/2023    BILITOT 0.6 06/13/2023    ALKPHOS 66 06/13/2023     No results found for: \"PROBNP\"  No results found for: \"LABA1C\"  Lab Results   Component Value Date    WBC 7.0 06/13/2023    HGB 16.6 06/13/2023    HCT 49.3 06/13/2023     06/13/2023      Stress  3/19/19       Resting ECG  nsr    Resting HR:82 bpm  Resting BP:132/76 mmHg   Stress Protocol:Exercise - Bjorn    Peak HR:150 bpm                         HR response: Appropriate  Peak BP:172/86 mmHg                     BP response: Normal resting BP -  Predicted HR: 164 bpm                   appropriate response  % of predicted HR: 91                   HR/BP product:63601                                         Max exrecise: 11 METS  Exercise duration: 09:59 min  Reason for termination:Target heart           Summary    Supervising physician Dr. Ashley Dumont . normal stress test, Normal tracer uptake    in all myocardial segment during rest and stress. Diaphragmatic attenuation    artifact noted. normal LVEF           Echo 3/18/19   Summary   LV function and size are normal, Ejection Fraction 55-60 %. Normal left ventricular wall thickness. No regional wall motion abnormalities were detected. Diastolic Dysfunction Grade I . All chamber dimensions are within normal limits. No significant valvular disease noted. RVSP= 28 mmHg. No evidence of pericardial effusion. Arterial doppler 3/18/19    Summary        The Right distal SFA exhibits 20-49% stenosis . The Right Distal SFA exhibits calcific plaquing. No focal stenosis noted in the arteries of the left lower extremity. Bilateral lower extremity arteries exhibit triphasic waveforms. Right ABIs show Mild peripheral arterial disease. The Left ADELITA shows normal arterial flow. March 2019   No evidence of AAA within the visualized portions of the abdominal aorta.       Arterial doppler 12/9/2022    The Right Proximal and mid SFA exhibits

## 2023-09-18 DIAGNOSIS — M54.2 NECK PAIN: Primary | ICD-10-CM

## 2023-09-18 NOTE — TELEPHONE ENCOUNTER
Patient stated his pain has not changed and has gotten worse. He does have weakness in his arms no pain in arms just neck and shoulder.

## 2023-09-21 DIAGNOSIS — M54.2 NECK PAIN: Primary | ICD-10-CM

## 2023-09-21 RX ORDER — CYCLOBENZAPRINE HCL 10 MG
10 TABLET ORAL 3 TIMES DAILY PRN
Qty: 30 TABLET | Refills: 0 | Status: SHIPPED | OUTPATIENT
Start: 2023-09-21 | End: 2023-10-01

## 2023-09-21 NOTE — TELEPHONE ENCOUNTER
Patient called stating he is having pain in neck and shoulder. He would like to know if something can be sent in for the pain. He stated he has the MRI scheduled for 09/22/2023.

## 2023-09-21 NOTE — TELEPHONE ENCOUNTER
An additional prescription sent to the pharmacy. He should take it along with naproxen that was sent in last week.

## 2023-09-22 ENCOUNTER — HOSPITAL ENCOUNTER (OUTPATIENT)
Dept: MRI IMAGING | Age: 63
Discharge: HOME | End: 2023-09-22
Payer: COMMERCIAL

## 2023-09-22 PROCEDURE — 72141 MRI NECK SPINE W/O DYE: CPT

## 2023-11-13 RX ORDER — LANOLIN ALCOHOL/MO/W.PET/CERES
400 CREAM (GRAM) TOPICAL DAILY
Qty: 30 TABLET | Refills: 5 | Status: SHIPPED | OUTPATIENT
Start: 2023-11-13

## 2023-12-02 DIAGNOSIS — I10 PRIMARY HYPERTENSION: ICD-10-CM

## 2023-12-04 RX ORDER — LISINOPRIL 10 MG/1
10 TABLET ORAL DAILY
Qty: 90 TABLET | Refills: 1 | OUTPATIENT
Start: 2023-12-04

## 2023-12-04 RX ORDER — ASPIRIN 81 MG/1
81 TABLET ORAL DAILY
Qty: 90 TABLET | Refills: 3 | Status: SHIPPED | OUTPATIENT
Start: 2023-12-04

## 2023-12-06 ENCOUNTER — OFFICE VISIT (OUTPATIENT)
Dept: FAMILY MEDICINE CLINIC | Age: 63
End: 2023-12-06
Payer: COMMERCIAL

## 2023-12-06 VITALS
HEART RATE: 103 BPM | BODY MASS INDEX: 27.73 KG/M2 | WEIGHT: 198.8 LBS | OXYGEN SATURATION: 98 % | SYSTOLIC BLOOD PRESSURE: 120 MMHG | TEMPERATURE: 96.8 F | DIASTOLIC BLOOD PRESSURE: 74 MMHG

## 2023-12-06 DIAGNOSIS — M54.2 NECK PAIN: ICD-10-CM

## 2023-12-06 DIAGNOSIS — I10 PRIMARY HYPERTENSION: Primary | ICD-10-CM

## 2023-12-06 PROBLEM — M54.12 CERVICAL RADICULOPATHY: Status: ACTIVE | Noted: 2023-11-06

## 2023-12-06 PROCEDURE — 3078F DIAST BP <80 MM HG: CPT | Performed by: FAMILY MEDICINE

## 2023-12-06 PROCEDURE — 3074F SYST BP LT 130 MM HG: CPT | Performed by: FAMILY MEDICINE

## 2023-12-06 PROCEDURE — 99214 OFFICE O/P EST MOD 30 MIN: CPT | Performed by: FAMILY MEDICINE

## 2023-12-06 RX ORDER — LISINOPRIL 10 MG/1
10 TABLET ORAL DAILY
Qty: 90 TABLET | Refills: 1 | Status: SHIPPED | OUTPATIENT
Start: 2023-12-06

## 2023-12-06 RX ORDER — ACETAMINOPHEN AND CODEINE PHOSPHATE 300; 30 MG/1; MG/1
1 TABLET ORAL EVERY 4 HOURS PRN
Qty: 42 TABLET | Refills: 0 | Status: SHIPPED | OUTPATIENT
Start: 2023-12-06 | End: 2023-12-13

## 2023-12-06 ASSESSMENT — PATIENT HEALTH QUESTIONNAIRE - PHQ9
SUM OF ALL RESPONSES TO PHQ QUESTIONS 1-9: 0
1. LITTLE INTEREST OR PLEASURE IN DOING THINGS: 0
SUM OF ALL RESPONSES TO PHQ9 QUESTIONS 1 & 2: 0
SUM OF ALL RESPONSES TO PHQ QUESTIONS 1-9: 0
2. FEELING DOWN, DEPRESSED OR HOPELESS: 0

## 2023-12-06 NOTE — PROGRESS NOTES
Subjective:      Edy Negro is a 66 y.o. male who presents for evaluation of hypertension. He indicates that he is feeling well and denies any symptoms referable to his elevated blood pressure. Specifically denies chest pain, palpitations, dyspnea, orthopnea, PND or peripheral edema. Current medication regimen is as listed below. Patient denies any side effects of medication. Has some neck pain after recent C3-C5 fusion on 11/6/23. Was doing well until he looked up quickly had has a sharp pain. Had Norco post surgery. Would like to have tylenol #3 for pain. Current Outpatient Medications   Medication Sig Dispense Refill    aspirin (ASPIRIN LOW DOSE) 81 MG EC tablet Take 1 tablet by mouth daily 90 tablet 3    magnesium oxide (MAG-OX) 400 (240 Mg) MG tablet Take 1 tablet by mouth daily 30 tablet 5    metoprolol tartrate (LOPRESSOR) 25 MG tablet Take 1 tablet by mouth 2 times daily 60 tablet 5    lidocaine (LIDODERM) 5 % Place 1 patch onto the skin daily 12 hours on, 12 hours off.  60 patch 0    naproxen (NAPROSYN) 500 MG tablet Take 1 tablet by mouth 2 times daily (with meals) 60 tablet 0    clopidogrel (PLAVIX) 75 MG tablet Take 1 tablet by mouth daily 90 tablet 1    fluticasone (FLONASE) 50 MCG/ACT nasal spray 2 sprays by Each Nostril route daily 16 g 0    lisinopril (PRINIVIL;ZESTRIL) 10 MG tablet Take 1 tablet by mouth daily 90 tablet 1    cilostazol (PLETAL) 100 MG tablet Take 1 tablet by mouth 2 times daily 180 tablet 3    atorvastatin (LIPITOR) 80 MG tablet Take 1 tablet by mouth daily 90 tablet 1    NONFORMULARY Patient states' he uses something called poppers for sexual arousement\"      ibuprofen (ADVIL;MOTRIN) 200 MG tablet Take 1 tablet by mouth every 6 hours as needed for Pain      Omega-3 Fatty Acids (FISH OIL) 1000 MG capsule Take by mouth daily      vitamin C (ASCORBIC ACID) 500 MG tablet Take 2 tablets by mouth daily      Cyanocobalamin (VITAMIN B 12) 500 MCG TABS Take 1,000 mg by

## 2024-01-30 RX ORDER — CLOPIDOGREL BISULFATE 75 MG/1
75 TABLET ORAL DAILY
Qty: 90 TABLET | Refills: 1 | Status: SHIPPED | OUTPATIENT
Start: 2024-01-30

## 2024-02-14 ENCOUNTER — OFFICE VISIT (OUTPATIENT)
Dept: FAMILY MEDICINE CLINIC | Age: 64
End: 2024-02-14
Payer: COMMERCIAL

## 2024-02-14 VITALS
TEMPERATURE: 98.4 F | SYSTOLIC BLOOD PRESSURE: 134 MMHG | DIASTOLIC BLOOD PRESSURE: 92 MMHG | OXYGEN SATURATION: 96 % | HEART RATE: 100 BPM

## 2024-02-14 DIAGNOSIS — J06.9 VIRAL URI: Primary | ICD-10-CM

## 2024-02-14 DIAGNOSIS — I10 PRIMARY HYPERTENSION: ICD-10-CM

## 2024-02-14 PROCEDURE — 99214 OFFICE O/P EST MOD 30 MIN: CPT | Performed by: FAMILY MEDICINE

## 2024-02-14 PROCEDURE — 3080F DIAST BP >= 90 MM HG: CPT | Performed by: FAMILY MEDICINE

## 2024-02-14 PROCEDURE — 3075F SYST BP GE 130 - 139MM HG: CPT | Performed by: FAMILY MEDICINE

## 2024-02-14 RX ORDER — DEXTROMETHORPHAN HYDROBROMIDE AND PROMETHAZINE HYDROCHLORIDE 15; 6.25 MG/5ML; MG/5ML
5 SYRUP ORAL 4 TIMES DAILY PRN
Qty: 240 ML | Refills: 0 | Status: SHIPPED | OUTPATIENT
Start: 2024-02-14

## 2024-02-14 RX ORDER — BENZONATATE 200 MG/1
200 CAPSULE ORAL 3 TIMES DAILY PRN
Qty: 30 CAPSULE | Refills: 0 | Status: SHIPPED | OUTPATIENT
Start: 2024-02-14

## 2024-02-14 NOTE — PROGRESS NOTES
Nicolas Jerry is a 63 y.o. year old male who complains of moderate nasal blockage, post nasal drip, productive cough, and sinus and nasal congestion for 5 days. Symptoms are worsening over that time. He denies a history of fevers, wheezing, and shortness of breath and denies a history of asthma.  He has taken nothing for this.    The patient is taking hypertensive medications compliantly without side effects.  Denies chest pain, dyspnea, edema, or TIA's.      Social History     Tobacco Use    Smoking status: Every Day     Current packs/day: 0.50     Average packs/day: 0.5 packs/day for 34.0 years (17.0 ttl pk-yrs)     Types: Cigarettes    Smokeless tobacco: Current     Types: Chew    Tobacco comments:     Ask patient not to chew tobacco 24-48- hours before procedure.   Substance Use Topics    Alcohol use: No     Alcohol/week: 0.0 standard drinks of alcohol        Current Outpatient Medications   Medication Sig Dispense Refill    clopidogrel (PLAVIX) 75 MG tablet Take 1 tablet by mouth daily 90 tablet 1    lisinopril (PRINIVIL;ZESTRIL) 10 MG tablet Take 1 tablet by mouth daily 90 tablet 1    aspirin (ASPIRIN LOW DOSE) 81 MG EC tablet Take 1 tablet by mouth daily 90 tablet 3    magnesium oxide (MAG-OX) 400 (240 Mg) MG tablet Take 1 tablet by mouth daily 30 tablet 5    metoprolol tartrate (LOPRESSOR) 25 MG tablet Take 1 tablet by mouth 2 times daily 60 tablet 5    lidocaine (LIDODERM) 5 % Place 1 patch onto the skin daily 12 hours on, 12 hours off. 60 patch 0    cilostazol (PLETAL) 100 MG tablet Take 1 tablet by mouth 2 times daily 180 tablet 3    atorvastatin (LIPITOR) 80 MG tablet Take 1 tablet by mouth daily 90 tablet 1    NONFORMULARY Patient states' he uses something called poppers for sexual arousement\"      ibuprofen (ADVIL;MOTRIN) 200 MG tablet Take 1 tablet by mouth every 6 hours as needed for Pain      Omega-3 Fatty Acids (FISH OIL) 1000 MG capsule Take by mouth daily      vitamin C (ASCORBIC ACID)

## 2024-03-11 ENCOUNTER — OFFICE VISIT (OUTPATIENT)
Dept: FAMILY MEDICINE CLINIC | Age: 64
End: 2024-03-11
Payer: COMMERCIAL

## 2024-03-11 VITALS
HEART RATE: 110 BPM | DIASTOLIC BLOOD PRESSURE: 86 MMHG | OXYGEN SATURATION: 97 % | TEMPERATURE: 97.5 F | SYSTOLIC BLOOD PRESSURE: 124 MMHG

## 2024-03-11 DIAGNOSIS — R49.0 HOARSENESS OF VOICE: ICD-10-CM

## 2024-03-11 DIAGNOSIS — I10 PRIMARY HYPERTENSION: ICD-10-CM

## 2024-03-11 DIAGNOSIS — J06.9 VIRAL URI: Primary | ICD-10-CM

## 2024-03-11 PROCEDURE — 99214 OFFICE O/P EST MOD 30 MIN: CPT | Performed by: FAMILY MEDICINE

## 2024-03-11 PROCEDURE — 3074F SYST BP LT 130 MM HG: CPT | Performed by: FAMILY MEDICINE

## 2024-03-11 PROCEDURE — 3079F DIAST BP 80-89 MM HG: CPT | Performed by: FAMILY MEDICINE

## 2024-03-11 RX ORDER — FLUTICASONE PROPIONATE 50 MCG
2 SPRAY, SUSPENSION (ML) NASAL DAILY
Qty: 16 G | Refills: 0 | Status: SHIPPED | OUTPATIENT
Start: 2024-03-11

## 2024-03-11 NOTE — PROGRESS NOTES
Nicolas Jerry is a 63 y.o. year old male who complains of moderate hoarseness, nasal blockage, white nasal discharge, post nasal drip, and productive cough for 3 days. Symptoms show no change over that time. He denies a history of chills, fevers, and shortness of breath and denies a history of asthma.  He has taken nothing for this.    The patient is taking hypertensive medications compliantly without side effects.  Denies chest pain, dyspnea, edema, or TIA's.      Social History     Tobacco Use    Smoking status: Every Day     Current packs/day: 0.50     Average packs/day: 0.5 packs/day for 34.0 years (17.0 ttl pk-yrs)     Types: Cigarettes    Smokeless tobacco: Current     Types: Chew    Tobacco comments:     Ask patient not to chew tobacco 24-48- hours before procedure.   Substance Use Topics    Alcohol use: No     Alcohol/week: 0.0 standard drinks of alcohol        Current Outpatient Medications   Medication Sig Dispense Refill    promethazine-dextromethorphan (PROMETHAZINE-DM) 6.25-15 MG/5ML syrup Take 5 mLs by mouth 4 times daily as needed for Cough 240 mL 0    benzonatate (TESSALON) 200 MG capsule Take 1 capsule by mouth 3 times daily as needed for Cough 30 capsule 0    clopidogrel (PLAVIX) 75 MG tablet Take 1 tablet by mouth daily 90 tablet 1    lisinopril (PRINIVIL;ZESTRIL) 10 MG tablet Take 1 tablet by mouth daily 90 tablet 1    aspirin (ASPIRIN LOW DOSE) 81 MG EC tablet Take 1 tablet by mouth daily 90 tablet 3    magnesium oxide (MAG-OX) 400 (240 Mg) MG tablet Take 1 tablet by mouth daily 30 tablet 5    metoprolol tartrate (LOPRESSOR) 25 MG tablet Take 1 tablet by mouth 2 times daily 60 tablet 5    lidocaine (LIDODERM) 5 % Place 1 patch onto the skin daily 12 hours on, 12 hours off. 60 patch 0    cilostazol (PLETAL) 100 MG tablet Take 1 tablet by mouth 2 times daily 180 tablet 3    atorvastatin (LIPITOR) 80 MG tablet Take 1 tablet by mouth daily 90 tablet 1    NONFORMULARY Patient states' he uses

## 2024-04-05 ENCOUNTER — OFFICE VISIT (OUTPATIENT)
Dept: CARDIOLOGY CLINIC | Age: 64
End: 2024-04-05
Payer: COMMERCIAL

## 2024-04-05 VITALS
HEART RATE: 89 BPM | BODY MASS INDEX: 29.75 KG/M2 | SYSTOLIC BLOOD PRESSURE: 138 MMHG | OXYGEN SATURATION: 96 % | WEIGHT: 207.8 LBS | DIASTOLIC BLOOD PRESSURE: 64 MMHG | HEIGHT: 70 IN

## 2024-04-05 DIAGNOSIS — Z72.0 TOBACCO USE: ICD-10-CM

## 2024-04-05 DIAGNOSIS — I25.10 ASCVD (ARTERIOSCLEROTIC CARDIOVASCULAR DISEASE): ICD-10-CM

## 2024-04-05 DIAGNOSIS — M54.12 CERVICAL RADICULOPATHY: ICD-10-CM

## 2024-04-05 DIAGNOSIS — I73.9 PERIPHERAL VASCULAR DISEASE, UNSPECIFIED (HCC): ICD-10-CM

## 2024-04-05 DIAGNOSIS — R21 RASH: ICD-10-CM

## 2024-04-05 DIAGNOSIS — Z72.0 TOBACCO CHEW USE: ICD-10-CM

## 2024-04-05 DIAGNOSIS — I10 PRIMARY HYPERTENSION: Primary | ICD-10-CM

## 2024-04-05 DIAGNOSIS — R93.1 ELEVATED CORONARY ARTERY CALCIUM SCORE: ICD-10-CM

## 2024-04-05 DIAGNOSIS — R93.89: ICD-10-CM

## 2024-04-05 PROCEDURE — 3075F SYST BP GE 130 - 139MM HG: CPT | Performed by: INTERNAL MEDICINE

## 2024-04-05 PROCEDURE — 99214 OFFICE O/P EST MOD 30 MIN: CPT | Performed by: INTERNAL MEDICINE

## 2024-04-05 PROCEDURE — 93000 ELECTROCARDIOGRAM COMPLETE: CPT | Performed by: INTERNAL MEDICINE

## 2024-04-05 PROCEDURE — 3078F DIAST BP <80 MM HG: CPT | Performed by: INTERNAL MEDICINE

## 2024-04-05 RX ORDER — CILOSTAZOL 100 MG/1
100 TABLET ORAL 2 TIMES DAILY
Qty: 180 TABLET | Refills: 3 | Status: SHIPPED | OUTPATIENT
Start: 2024-04-05

## 2024-04-05 RX ORDER — CHLORAL HYDRATE 500 MG
1000 CAPSULE ORAL DAILY
Qty: 90 CAPSULE | Refills: 4 | Status: SHIPPED | OUTPATIENT
Start: 2024-04-05

## 2024-04-05 RX ORDER — ATORVASTATIN CALCIUM 80 MG/1
80 TABLET, FILM COATED ORAL DAILY
Qty: 90 TABLET | Refills: 3 | Status: SHIPPED | OUTPATIENT
Start: 2024-04-05

## 2024-04-05 RX ORDER — CLOPIDOGREL BISULFATE 75 MG/1
75 TABLET ORAL DAILY
Qty: 90 TABLET | Refills: 3 | Status: SHIPPED | OUTPATIENT
Start: 2024-04-05

## 2024-04-05 RX ORDER — ASPIRIN 81 MG/1
81 TABLET ORAL DAILY
Qty: 90 TABLET | Refills: 3 | Status: SHIPPED | OUTPATIENT
Start: 2024-04-05

## 2024-04-05 RX ORDER — LANOLIN ALCOHOL/MO/W.PET/CERES
400 CREAM (GRAM) TOPICAL DAILY
Qty: 30 TABLET | Refills: 5 | Status: SHIPPED | OUTPATIENT
Start: 2024-04-05

## 2024-04-05 NOTE — PROGRESS NOTES
significant valvular disease noted.   RVSP= 28 mmHg.   No evidence of pericardial effusion.         Arterial doppler 3/18/19    Summary        The Right distal SFA exhibits 20-49% stenosis .    The Right Distal SFA exhibits calcific plaquing.    No focal stenosis noted in the arteries of the left lower extremity.    Bilateral lower extremity arteries exhibit triphasic waveforms.    Right ABIs show Mild peripheral arterial disease.    The Left ADELITA shows normal arterial flow.         March 2019   No evidence of AAA within the visualized portions of the abdominal aorta.      Arterial doppler 12/9/2022    The Right Proximal and mid SFA exhibits an occlusion.    No evidence of significant occlusive arterial disease in the left lower    extremity.    Right ABIs show Moderate peripheral arterial disease, at rest.    The Left ADELITA shows Mild peripheral arterial disease.               All labs, medications and tests reviewed by myself including data and history from outside source , patient and available family .  Assessment & Plan:      1. Primary hypertension    2. ASCVD (arteriosclerotic cardiovascular disease)    3. Cervical radiculopathy    4. Elevated coronary artery calcium score    5. Peripheral vascular disease, unspecified (HCC)    6. Rash    7. Tobacco chew use    8. Tobacco use    9. Abnormal peripheral angiography           ASCVD (arteriosclerotic cardiovascular disease)  Calcium score of 1948.  Start aspirin.   Cardiolite  in 2021 shows no  ischemia.  He should also be screened for AAA, given his history of tobacco abuse and family history of aortic aneurysm. Increase stains to 80 mg   aspirin and   He had no AAA  On screening  In march 2019   Continue metoprolol 25 mg bid due to tachycardia, asked to take apirin 81 mg daily   Repeat stress test ideally Cardiolite     PVD  Continue pletal s/p PCI to  right sided sfa occlusion improved to 0%  continue plavix , aspirin and pletal encourage to walk , lipitor 80 mg

## 2024-04-05 NOTE — PATIENT INSTRUCTIONS
Please be informed that if you contact our office outside of normal business hours the physician on call cannot help with any scheduling or rescheduling issues, procedure instruction questions or any type of medication issue.    We advise you for any urgent/emergency that you go to the nearest emergency room!    PLEASE CALL OUR OFFICE DURING NORMAL BUSINESS HOURS    Monday - Friday   8 am to 5 pm    [place]: [phone]    [place]: [phone]    [place]:  [phone]  **It is YOUR responsibilty to bring medication bottles and/or updated medication list to EACH APPOINTMENT. This will allow us to better serve you and all your healthcare needs**  Thank you for allowing us to care for you today!   We want to ensure we can follow your treatment plan and we strive to give you the best outcomes and experience possible.   If you ever have a life threatening emergency and call 911 - for an ambulance (EMS)   Our providers can only care for you at:   [facility] or [facility].   Even if you have someone take you or you drive yourself we can only care for you in a [facility] facility. Our providers are not setup at the other healthcare locations!   We are committed to providing you the best care possible.    If you receive a survey after visiting one of our offices, please take time to share your experience concerning your physician office visit.  These surveys are confidential and no health information about you is shared.    We are eager to improve for you and we are counting on your feedback to help make that happen.

## 2024-04-17 ENCOUNTER — TELEPHONE (OUTPATIENT)
Dept: CARDIOLOGY CLINIC | Age: 64
End: 2024-04-17

## 2024-04-17 NOTE — TELEPHONE ENCOUNTER
Vascular US Duplex Lower Extremity Arteries Bilateral      Right side findings: Resting ADELITA is 1.03.    Left side findings: Resting ADELITA is 1.03.    No evidence of significant arterial occlusive disease in bilateral lower extremities.    Next visit 10/4/24 71oni new     Spoke to pt regarding results and follow up appt. Patient advised and voices understanding.

## 2024-04-18 ENCOUNTER — TELEPHONE (OUTPATIENT)
Dept: CARDIOLOGY CLINIC | Age: 64
End: 2024-04-18

## 2024-04-18 NOTE — TELEPHONE ENCOUNTER
Vascular US Duplex Lower Extremity Arteries Bilateral      Right side findings: Resting ADELITA is 1.03.    Left side findings: Resting ADELITA is 1.03.    No evidence of significant arterial occlusive disease in bilateral lower extremities.    Spoke to pt regarding results and follow up appt. Patient advised and voices understanding.

## 2024-04-25 ENCOUNTER — TELEPHONE (OUTPATIENT)
Dept: CARDIOLOGY CLINIC | Age: 64
End: 2024-04-25

## 2024-04-25 NOTE — TELEPHONE ENCOUNTER
Nuclear exercise stress test with myocardial perfusion        LV perfusion is normal. There is no evidence of inducible ischemia.    The ECG was negative for ischemia.    Stress Test: A Bjorn protocol stress test was performed. Overall, the patient's exercise capacity was above average for their age. The patient reached stage 3 of the protocol and was stressed for 9 min and 0 sec. Hemodynamics are adequate for diagnosis. Blood pressure demonstrated a normal response and heart rate demonstrated a normal response to stress. The patient's heart rate recovery was normal. Completed 10.1 METS     Left msg for pt to return call regarding results.   Spoke to pt regarding results and follow up appt.

## 2024-05-28 DIAGNOSIS — I10 PRIMARY HYPERTENSION: ICD-10-CM

## 2024-05-28 RX ORDER — LISINOPRIL 10 MG/1
10 TABLET ORAL DAILY
Qty: 90 TABLET | Refills: 1 | OUTPATIENT
Start: 2024-05-28

## 2024-06-17 ENCOUNTER — OFFICE VISIT (OUTPATIENT)
Dept: FAMILY MEDICINE CLINIC | Age: 64
End: 2024-06-17
Payer: COMMERCIAL

## 2024-06-17 VITALS
SYSTOLIC BLOOD PRESSURE: 130 MMHG | BODY MASS INDEX: 28.41 KG/M2 | OXYGEN SATURATION: 96 % | HEART RATE: 102 BPM | WEIGHT: 198 LBS | DIASTOLIC BLOOD PRESSURE: 84 MMHG | TEMPERATURE: 97.1 F

## 2024-06-17 DIAGNOSIS — I10 PRIMARY HYPERTENSION: Primary | ICD-10-CM

## 2024-06-17 DIAGNOSIS — R73.09 ELEVATED GLUCOSE: ICD-10-CM

## 2024-06-17 DIAGNOSIS — E78.2 MIXED HYPERLIPIDEMIA: ICD-10-CM

## 2024-06-17 PROCEDURE — 3079F DIAST BP 80-89 MM HG: CPT | Performed by: FAMILY MEDICINE

## 2024-06-17 PROCEDURE — 3075F SYST BP GE 130 - 139MM HG: CPT | Performed by: FAMILY MEDICINE

## 2024-06-17 PROCEDURE — 36415 COLL VENOUS BLD VENIPUNCTURE: CPT | Performed by: FAMILY MEDICINE

## 2024-06-17 PROCEDURE — 99214 OFFICE O/P EST MOD 30 MIN: CPT | Performed by: FAMILY MEDICINE

## 2024-06-17 RX ORDER — LISINOPRIL 10 MG/1
10 TABLET ORAL DAILY
Qty: 90 TABLET | Refills: 1 | Status: SHIPPED | OUTPATIENT
Start: 2024-06-17

## 2024-06-17 SDOH — ECONOMIC STABILITY: FOOD INSECURITY: WITHIN THE PAST 12 MONTHS, THE FOOD YOU BOUGHT JUST DIDN'T LAST AND YOU DIDN'T HAVE MONEY TO GET MORE.: NEVER TRUE

## 2024-06-17 SDOH — ECONOMIC STABILITY: FOOD INSECURITY: WITHIN THE PAST 12 MONTHS, YOU WORRIED THAT YOUR FOOD WOULD RUN OUT BEFORE YOU GOT MONEY TO BUY MORE.: NEVER TRUE

## 2024-06-17 SDOH — ECONOMIC STABILITY: INCOME INSECURITY: HOW HARD IS IT FOR YOU TO PAY FOR THE VERY BASICS LIKE FOOD, HOUSING, MEDICAL CARE, AND HEATING?: NOT HARD AT ALL

## 2024-06-17 SDOH — ECONOMIC STABILITY: HOUSING INSECURITY
IN THE LAST 12 MONTHS, WAS THERE A TIME WHEN YOU DID NOT HAVE A STEADY PLACE TO SLEEP OR SLEPT IN A SHELTER (INCLUDING NOW)?: NO

## 2024-06-17 ASSESSMENT — PATIENT HEALTH QUESTIONNAIRE - PHQ9
SUM OF ALL RESPONSES TO PHQ QUESTIONS 1-9: 0
SUM OF ALL RESPONSES TO PHQ QUESTIONS 1-9: 0
SUM OF ALL RESPONSES TO PHQ9 QUESTIONS 1 & 2: 0
1. LITTLE INTEREST OR PLEASURE IN DOING THINGS: NOT AT ALL
SUM OF ALL RESPONSES TO PHQ QUESTIONS 1-9: 0
2. FEELING DOWN, DEPRESSED OR HOPELESS: NOT AT ALL
SUM OF ALL RESPONSES TO PHQ QUESTIONS 1-9: 0

## 2024-06-17 NOTE — PROGRESS NOTES
Subjective:      Nicolas Jerry is a 64 y.o. male who presents for evaluation of hypertension and hyperlipidemia. He indicates that he is feeling well and denies any symptoms referable to his elevated blood pressure.   Specifically denies chest pain, palpitations, dyspnea, orthopnea, PND or peripheral edema.  No anorexia, arthralgia, or leg cramps noted. Current medication regimen is as listed below. He denies any side effects of medication, and has been taking it regularly.    Current Outpatient Medications   Medication Sig Dispense Refill    cilostazol (PLETAL) 100 MG tablet Take 1 tablet by mouth 2 times daily 180 tablet 3    metoprolol tartrate (LOPRESSOR) 25 MG tablet Take 1 tablet by mouth 2 times daily 180 tablet 5    magnesium oxide (MAG-OX) 400 (240 Mg) MG tablet Take 1 tablet by mouth daily 30 tablet 5    clopidogrel (PLAVIX) 75 MG tablet Take 1 tablet by mouth daily 90 tablet 3    atorvastatin (LIPITOR) 80 MG tablet Take 1 tablet by mouth daily 90 tablet 3    aspirin (ASPIRIN LOW DOSE) 81 MG EC tablet Take 1 tablet by mouth daily 90 tablet 3    Omega-3 Fatty Acids (FISH OIL) 1000 MG capsule Take 1 capsule by mouth daily 90 capsule 4    fluticasone (FLONASE) 50 MCG/ACT nasal spray 2 sprays by Each Nostril route daily 16 g 0    lisinopril (PRINIVIL;ZESTRIL) 10 MG tablet Take 1 tablet by mouth daily 90 tablet 1    lidocaine (LIDODERM) 5 % Place 1 patch onto the skin daily 12 hours on, 12 hours off. 60 patch 0    NONFORMULARY Patient states' he uses something called poppers for sexual arousement\"      ibuprofen (ADVIL;MOTRIN) 200 MG tablet Take 1 tablet by mouth every 6 hours as needed for Pain      vitamin C (ASCORBIC ACID) 500 MG tablet Take 2 tablets by mouth daily      Cyanocobalamin (VITAMIN B 12) 500 MCG TABS Take 1,000 mg by mouth      Cetirizine HCl (ZYRTEC ALLERGY PO) Take by mouth      Acetaminophen (TYLENOL) 325 MG CAPS Take by mouth       No current facility-administered medications for this

## 2024-06-18 LAB
ALBUMIN SERPL-MCNC: 4.6 G/DL (ref 3.4–5)
ALBUMIN/GLOB SERPL: 2.2 {RATIO} (ref 1.1–2.2)
ALP SERPL-CCNC: 64 U/L (ref 40–129)
ALT SERPL-CCNC: 50 U/L (ref 10–40)
ANION GAP SERPL CALCULATED.3IONS-SCNC: 13 MMOL/L (ref 3–16)
AST SERPL-CCNC: 24 U/L (ref 15–37)
BILIRUB SERPL-MCNC: 0.5 MG/DL (ref 0–1)
BUN SERPL-MCNC: 15 MG/DL (ref 7–20)
CALCIUM SERPL-MCNC: 9.1 MG/DL (ref 8.3–10.6)
CHLORIDE SERPL-SCNC: 100 MMOL/L (ref 99–110)
CHOLEST SERPL-MCNC: 191 MG/DL (ref 0–199)
CO2 SERPL-SCNC: 25 MMOL/L (ref 21–32)
CREAT SERPL-MCNC: 0.9 MG/DL (ref 0.8–1.3)
EST. AVERAGE GLUCOSE BLD GHB EST-MCNC: 125.5 MG/DL
GFR SERPLBLD CREATININE-BSD FMLA CKD-EPI: >90 ML/MIN/{1.73_M2}
GLUCOSE SERPL-MCNC: 114 MG/DL (ref 70–99)
HBA1C MFR BLD: 6 %
HDLC SERPL-MCNC: 51 MG/DL (ref 40–60)
LDLC SERPL CALC-MCNC: 112 MG/DL
POTASSIUM SERPL-SCNC: 4.3 MMOL/L (ref 3.5–5.1)
PROT SERPL-MCNC: 6.7 G/DL (ref 6.4–8.2)
SODIUM SERPL-SCNC: 138 MMOL/L (ref 136–145)
TRIGL SERPL-MCNC: 138 MG/DL (ref 0–150)
VLDLC SERPL CALC-MCNC: 28 MG/DL

## 2024-10-04 ENCOUNTER — OFFICE VISIT (OUTPATIENT)
Dept: CARDIOLOGY CLINIC | Age: 64
End: 2024-10-04
Payer: COMMERCIAL

## 2024-10-04 VITALS
BODY MASS INDEX: 28.2 KG/M2 | HEART RATE: 74 BPM | SYSTOLIC BLOOD PRESSURE: 150 MMHG | DIASTOLIC BLOOD PRESSURE: 100 MMHG | WEIGHT: 197 LBS | HEIGHT: 70 IN

## 2024-10-04 DIAGNOSIS — I10 PRIMARY HYPERTENSION: Primary | ICD-10-CM

## 2024-10-04 DIAGNOSIS — Z72.0 TOBACCO CHEW USE: ICD-10-CM

## 2024-10-04 DIAGNOSIS — Z72.0 TOBACCO USE: ICD-10-CM

## 2024-10-04 DIAGNOSIS — I73.9 PERIPHERAL VASCULAR DISEASE, UNSPECIFIED (HCC): ICD-10-CM

## 2024-10-04 DIAGNOSIS — I25.10 ASCVD (ARTERIOSCLEROTIC CARDIOVASCULAR DISEASE): ICD-10-CM

## 2024-10-04 DIAGNOSIS — R93.1 ELEVATED CORONARY ARTERY CALCIUM SCORE: ICD-10-CM

## 2024-10-04 PROCEDURE — 3080F DIAST BP >= 90 MM HG: CPT | Performed by: INTERNAL MEDICINE

## 2024-10-04 PROCEDURE — 93000 ELECTROCARDIOGRAM COMPLETE: CPT | Performed by: INTERNAL MEDICINE

## 2024-10-04 PROCEDURE — 99214 OFFICE O/P EST MOD 30 MIN: CPT | Performed by: INTERNAL MEDICINE

## 2024-10-04 PROCEDURE — 3077F SYST BP >= 140 MM HG: CPT | Performed by: INTERNAL MEDICINE

## 2024-10-04 RX ORDER — LISINOPRIL 10 MG/1
10 TABLET ORAL DAILY
Qty: 90 TABLET | Refills: 3 | Status: SHIPPED | OUTPATIENT
Start: 2024-10-04

## 2024-10-04 RX ORDER — METOPROLOL TARTRATE 25 MG/1
25 TABLET, FILM COATED ORAL 2 TIMES DAILY
Qty: 180 TABLET | Refills: 5 | Status: SHIPPED | OUTPATIENT
Start: 2024-10-04

## 2024-10-04 RX ORDER — CLOPIDOGREL BISULFATE 75 MG/1
75 TABLET ORAL DAILY
Qty: 90 TABLET | Refills: 3 | Status: SHIPPED | OUTPATIENT
Start: 2024-10-04

## 2024-10-04 RX ORDER — ATORVASTATIN CALCIUM 80 MG/1
80 TABLET, FILM COATED ORAL DAILY
Qty: 90 TABLET | Refills: 3 | Status: SHIPPED | OUTPATIENT
Start: 2024-10-04

## 2024-10-04 NOTE — PROGRESS NOTES
peripheral arterial disease.    The Left ADELITA shows normal arterial flow.         March 2019   No evidence of AAA within the visualized portions of the abdominal aorta.      Arterial doppler 12/9/2022    The Right Proximal and mid SFA exhibits an occlusion.    No evidence of significant occlusive arterial disease in the left lower    extremity.    Right ABIs show Moderate peripheral arterial disease, at rest.    The Left ADELITA shows Mild peripheral arterial disease.               All labs, medications and tests reviewed by myself including data and history from outside source , patient and available family .  Assessment & Plan:      1. Primary hypertension    2. ASCVD (arteriosclerotic cardiovascular disease)    3. Elevated coronary artery calcium score    4. Tobacco chew use    5. Tobacco use    6. Peripheral vascular disease, unspecified (HCC)           ASCVD (arteriosclerotic cardiovascular disease)  Calcium score of 1948.  Start aspirin.   Cardiolite  in 2021 shows no  ischemia.  He should also be screened for AAA, given his history of tobacco abuse and family history of aortic aneurysm. Increase stains to 80 mg   aspirin and   He had no AAA  On screening  In march 2019   Continue metoprolol 25 mg bid due to tachycardia, asked to take apirin 81 mg daily       PVD  Continue pletal s/p PCI to  right sided sfa occlusion improved to 0%  continue plavix , aspirin and pletal encourage to walk , lipitor 80 mg daily   Arterial doppler in April 2024 showed improved flow       Tobacco use  We talked extensively about the risk modification.  He is advised to quit smoking.  We will give him nicotine gum, as well as Chantix starter pack    Essential hypertension  Blood pressures fairly well-controlled, little high today but says better at home  Continue lisinopril 10 mg daily and metoprolol 25 mg bid        Dyslipidemia :  All available lab work was reviewed.  Patient was advised to repeat lab work before next 
WEAKNESS

## 2024-10-17 ENCOUNTER — OFFICE VISIT (OUTPATIENT)
Dept: FAMILY MEDICINE CLINIC | Age: 64
End: 2024-10-17

## 2024-10-17 VITALS
WEIGHT: 194 LBS | DIASTOLIC BLOOD PRESSURE: 70 MMHG | OXYGEN SATURATION: 95 % | HEIGHT: 70 IN | BODY MASS INDEX: 27.77 KG/M2 | SYSTOLIC BLOOD PRESSURE: 108 MMHG | TEMPERATURE: 96.8 F | HEART RATE: 75 BPM

## 2024-10-17 DIAGNOSIS — Z13.6 SCREENING FOR CARDIOVASCULAR CONDITION: ICD-10-CM

## 2024-10-17 DIAGNOSIS — Z72.0 TOBACCO USE: ICD-10-CM

## 2024-10-17 DIAGNOSIS — Z13.1 SCREENING FOR DIABETES MELLITUS: ICD-10-CM

## 2024-10-17 DIAGNOSIS — Z00.00 ENCOUNTER FOR WELL ADULT EXAM WITHOUT ABNORMAL FINDINGS: Primary | ICD-10-CM

## 2024-10-17 NOTE — PROGRESS NOTES
08/31/2021, 09/27/2022    Pneumococcal, PPSV23, PNEUMOVAX 23, (age 2y+), SC/IM, 0.5mL 12/14/2017    TDaP, ADACEL (age 10y-64y), BOOSTRIX (age 10y+), IM, 0.5mL 02/02/2017    Zoster Recombinant (Shingrix) 11/09/2021, 04/21/2022        Health Maintenance Due   Topic Date Due    HIV screen  Never done    Pneumococcal 0-64 years Vaccine (2 of 2 - PCV) 12/14/2018    Respiratory Syncytial Virus (RSV) Pregnant or age 60 yrs+ (1 - 1-dose 60+ series) Never done    Lung Cancer Screening &/or Counseling  10/05/2023    Flu vaccine (1) 08/01/2024     Recommendations for Preventive Services Due: see orders and patient instructions/AVS.

## 2024-10-18 LAB
ALBUMIN SERPL-MCNC: 4.5 G/DL (ref 3.4–5)
ALBUMIN/GLOB SERPL: 2.3 {RATIO} (ref 1.1–2.2)
ALP SERPL-CCNC: 75 U/L (ref 40–129)
ALT SERPL-CCNC: 37 U/L (ref 10–40)
ANION GAP SERPL CALCULATED.3IONS-SCNC: 13 MMOL/L (ref 3–16)
AST SERPL-CCNC: 24 U/L (ref 15–37)
BILIRUB SERPL-MCNC: 0.4 MG/DL (ref 0–1)
BUN SERPL-MCNC: 18 MG/DL (ref 7–20)
CALCIUM SERPL-MCNC: 9.7 MG/DL (ref 8.3–10.6)
CHLORIDE SERPL-SCNC: 106 MMOL/L (ref 99–110)
CHOLEST SERPL-MCNC: 141 MG/DL (ref 0–199)
CO2 SERPL-SCNC: 22 MMOL/L (ref 21–32)
CREAT SERPL-MCNC: 1 MG/DL (ref 0.8–1.3)
EST. AVERAGE GLUCOSE BLD GHB EST-MCNC: 122.6 MG/DL
GFR SERPLBLD CREATININE-BSD FMLA CKD-EPI: 85 ML/MIN/{1.73_M2}
GLUCOSE SERPL-MCNC: 89 MG/DL (ref 70–99)
HBA1C MFR BLD: 5.9 %
HDLC SERPL-MCNC: 43 MG/DL (ref 40–60)
LDLC SERPL CALC-MCNC: 71 MG/DL
POTASSIUM SERPL-SCNC: 4.7 MMOL/L (ref 3.5–5.1)
PROT SERPL-MCNC: 6.5 G/DL (ref 6.4–8.2)
PSA SERPL DL<=0.01 NG/ML-MCNC: 1.08 NG/ML (ref 0–4)
SODIUM SERPL-SCNC: 141 MMOL/L (ref 136–145)
TRIGL SERPL-MCNC: 137 MG/DL (ref 0–150)
VLDLC SERPL CALC-MCNC: 27 MG/DL

## 2024-11-04 ENCOUNTER — HOSPITAL ENCOUNTER (OUTPATIENT)
Dept: CT IMAGING | Age: 64
Discharge: HOME | End: 2024-11-04
Payer: COMMERCIAL

## 2024-11-04 DIAGNOSIS — Z72.0 TOBACCO USE: ICD-10-CM

## 2024-11-04 PROCEDURE — 71271 CT THORAX LUNG CANCER SCR C-: CPT

## 2024-11-15 ENCOUNTER — LAB (OUTPATIENT)
Dept: FAMILY MEDICINE CLINIC | Age: 64
End: 2024-11-15
Payer: COMMERCIAL

## 2024-11-15 DIAGNOSIS — Z23 IMMUNIZATION DUE: Primary | ICD-10-CM

## 2024-11-15 PROCEDURE — 90715 TDAP VACCINE 7 YRS/> IM: CPT | Performed by: FAMILY MEDICINE

## 2024-11-15 PROCEDURE — 90471 IMMUNIZATION ADMIN: CPT | Performed by: FAMILY MEDICINE

## 2025-05-02 ENCOUNTER — OFFICE VISIT (OUTPATIENT)
Dept: CARDIOLOGY CLINIC | Age: 65
End: 2025-05-02
Payer: COMMERCIAL

## 2025-05-02 VITALS
HEART RATE: 86 BPM | BODY MASS INDEX: 29.26 KG/M2 | HEIGHT: 70 IN | WEIGHT: 204.4 LBS | DIASTOLIC BLOOD PRESSURE: 60 MMHG | SYSTOLIC BLOOD PRESSURE: 98 MMHG

## 2025-05-02 DIAGNOSIS — R93.89: ICD-10-CM

## 2025-05-02 DIAGNOSIS — I73.9 PERIPHERAL VASCULAR DISEASE, UNSPECIFIED: ICD-10-CM

## 2025-05-02 DIAGNOSIS — I10 PRIMARY HYPERTENSION: ICD-10-CM

## 2025-05-02 DIAGNOSIS — Z72.0 TOBACCO USE: ICD-10-CM

## 2025-05-02 DIAGNOSIS — I25.10 ASCVD (ARTERIOSCLEROTIC CARDIOVASCULAR DISEASE): Primary | ICD-10-CM

## 2025-05-02 DIAGNOSIS — R93.1 ELEVATED CORONARY ARTERY CALCIUM SCORE: ICD-10-CM

## 2025-05-02 DIAGNOSIS — Z72.0 TOBACCO CHEW USE: ICD-10-CM

## 2025-05-02 DIAGNOSIS — M54.12 CERVICAL RADICULOPATHY: ICD-10-CM

## 2025-05-02 PROCEDURE — 3078F DIAST BP <80 MM HG: CPT | Performed by: INTERNAL MEDICINE

## 2025-05-02 PROCEDURE — 99214 OFFICE O/P EST MOD 30 MIN: CPT | Performed by: INTERNAL MEDICINE

## 2025-05-02 PROCEDURE — 3074F SYST BP LT 130 MM HG: CPT | Performed by: INTERNAL MEDICINE

## 2025-05-02 RX ORDER — MAGNESIUM OXIDE 400 MG/1
400 TABLET ORAL DAILY
COMMUNITY

## 2025-05-02 NOTE — PROGRESS NOTES
CLINICAL STAFF DOCUMENTATION         Nicolas DAVALOS Rosalino  1960  [id number]    Have you had any Chest Pain recently? - No          Have you had any Shortness of Breath - No    Have you had any dizziness - No    Have you had any palpitations recently? - No      Do you have any edema - swelling in No      When did you have your last labs drawn 10/17/2024  What doctor ordered FLACO DIAZ MD   Do we have the labs in their chart Yes      Do you need any prescriptions refilled? - No    Do you have a surgery or procedure scheduled in the near future - No        Caffeine? - Yes  How much caffeine? .3  cups       
capsule Take 1 capsule by mouth daily 90 capsule 4    ibuprofen (ADVIL;MOTRIN) 200 MG tablet Take 1 tablet by mouth every 6 hours as needed for Pain      Cyanocobalamin (VITAMIN B 12) 500 MCG TABS Take 1,000 mg by mouth      Cetirizine HCl (ZYRTEC ALLERGY PO) Take by mouth      Acetaminophen (TYLENOL) 325 MG CAPS Take by mouth      NONFORMULARY Patient states' he uses something called poppers for sexual arousement\" (Patient not taking: Reported on 10/4/2024)      vitamin C (ASCORBIC ACID) 500 MG tablet Take 2 tablets by mouth daily (Patient not taking: Reported on 5/2/2025)       No current facility-administered medications for this visit.       Allergies:   Patient has no known allergies.    Patient History:  Past Medical History:   Diagnosis Date    H/O cardiovascular stress test 03/19/2019    Normal study.    H/O Doppler abd aorta ultrasound 03/19/2019    No evidence of AAA within the visualized portions of the abdominal aorta.    H/O Doppler lower arterial ultrasound 03/18/2019    R SFA 20-49% stenosis, Right ABIs show Mild peripheral arterial disease.    H/O echocardiogram 03/18/2019    EF 55-60%, WNL    H/O peripheral angiogram 12/19/2022    PCI - SFA    Hypertension     MRSA (methicillin resistant Staphylococcus aureus) \"2010 And Early 2012\"    \"Butt Twice\"    Neck problem     \"Get Adjusted As Needed  Sees Dr. Carlos Eduardo Padillaiopractor In [place]\" disc problems    Pneumonia \"As A Child\"    No Current Symtoms    Shortness of breath on exertion     Tinnitus of both ears      Past Surgical History:   Procedure Laterality Date    CARPAL TUNNEL RELEASE  2005    Right    CATARACT REMOVAL      bid    CERVICAL FUSION N/A 11/06/2023    C3-5    COLONOSCOPY  2010    due 2020    COLONOSCOPY N/A 11/07/2022    COLONOSCOPY POLYPECTOMY SNARE/COLD BIOPSY performed by Kaylynn Sung MD at [facility] ASC OR    FINGER SURGERY  1976    Reattached Right Little Finger Due To Table Saw Accident    INGUINAL HERNIA REPAIR  09/11/2012    Left

## 2025-05-19 ENCOUNTER — RESULTS FOLLOW-UP (OUTPATIENT)
Dept: CARDIOLOGY CLINIC | Age: 65
End: 2025-05-19

## 2025-06-04 ENCOUNTER — TELEPHONE (OUTPATIENT)
Dept: CARDIOLOGY CLINIC | Age: 65
End: 2025-06-04

## 2025-06-11 NOTE — TELEPHONE ENCOUNTER
LM returning pts call.     Vascular duplex lower extremity-    Right side findings: Resting ADELITA is 0.93. This is mildly decreased.    Left side findings: Resting ADELITA is 1.03. This is within the normal range.    No hemodynamically significant stenosis in the bilateral lower extremities.    Right SFA distal velocity increase , however waveforms remain multiphasic and vessel is tortuous.  Most likely <50% stenosis in right SFA.      Keep scheduled f/u visit.

## 2025-07-07 ENCOUNTER — OFFICE VISIT (OUTPATIENT)
Dept: CARDIOLOGY CLINIC | Age: 65
End: 2025-07-07
Payer: COMMERCIAL

## 2025-07-07 VITALS
BODY MASS INDEX: 28.46 KG/M2 | HEART RATE: 71 BPM | HEIGHT: 70 IN | SYSTOLIC BLOOD PRESSURE: 112 MMHG | DIASTOLIC BLOOD PRESSURE: 60 MMHG | WEIGHT: 198.8 LBS

## 2025-07-07 DIAGNOSIS — Z72.0 TOBACCO CHEW USE: ICD-10-CM

## 2025-07-07 DIAGNOSIS — I10 PRIMARY HYPERTENSION: ICD-10-CM

## 2025-07-07 DIAGNOSIS — Z72.0 TOBACCO USE: ICD-10-CM

## 2025-07-07 DIAGNOSIS — R93.1 ELEVATED CORONARY ARTERY CALCIUM SCORE: ICD-10-CM

## 2025-07-07 DIAGNOSIS — I25.10 ASCVD (ARTERIOSCLEROTIC CARDIOVASCULAR DISEASE): Primary | ICD-10-CM

## 2025-07-07 PROCEDURE — 99214 OFFICE O/P EST MOD 30 MIN: CPT | Performed by: INTERNAL MEDICINE

## 2025-07-07 PROCEDURE — 3074F SYST BP LT 130 MM HG: CPT | Performed by: INTERNAL MEDICINE

## 2025-07-07 PROCEDURE — 3078F DIAST BP <80 MM HG: CPT | Performed by: INTERNAL MEDICINE

## 2025-07-07 NOTE — PROGRESS NOTES
CLINICAL STAFF DOCUMENTATION    Dr. Ang Jerry  1960  [id number]    Have you had any Chest Pain recently? - No  Have you had any Shortness of Breath - Yes  When did it begin? - Years   If Yes - When on exertion  Have you had any dizziness - No  Have you had any palpitations recently? - No  Do you have any edema - swelling in No    Do you have a surgery or procedure scheduled in the near future - No    Do use tobacco products? - Yes 1 pack   Do you drink alcohol? - No  Do you use any illicit drugs? - No  Caffeine? - Yes  How much caffeine? .2  cups       Check medication list thoroughly!!! AND RECONCILE OUTSIDE MEDICATIONS  If dose has changed change the entire order not just the MG  BE SURE TO ASK PATIENT IF THEY NEED MEDICATION REFILLS  Verify Pharmacy and update if incorrect    Add to every patient's \"wrap up\" the following dot phrase AFTERVISITCARDIOHEARTHOUSE and ensure we explain this to our patients   
capsule Take 1 capsule by mouth daily 90 capsule 4    NONFORMULARY Patient states' he uses something called poppers for sexual arousement\"      ibuprofen (ADVIL;MOTRIN) 200 MG tablet Take 1 tablet by mouth every 6 hours as needed for Pain      vitamin C (ASCORBIC ACID) 500 MG tablet Take 2 tablets by mouth daily      Cyanocobalamin (VITAMIN B 12) 500 MCG TABS Take 1,000 mg by mouth      Cetirizine HCl (ZYRTEC ALLERGY PO) Take by mouth      Acetaminophen (TYLENOL) 325 MG CAPS Take by mouth       No current facility-administered medications for this visit.       Allergies:   Patient has no known allergies.    Patient History:  Past Medical History:   Diagnosis Date    H/O cardiovascular stress test 03/19/2019    Normal study.    H/O Doppler abd aorta ultrasound 03/19/2019    No evidence of AAA within the visualized portions of the abdominal aorta.    H/O Doppler lower arterial ultrasound 03/18/2019    R SFA 20-49% stenosis, Right ABIs show Mild peripheral arterial disease.    H/O echocardiogram 03/18/2019    EF 55-60%, WNL    H/O peripheral angiogram 12/19/2022    PCI - SFA    Hypertension     MRSA (methicillin resistant Staphylococcus aureus) \"2010 And Early 2012\"    \"Butt Twice\"    Neck problem     \"Get Adjusted As Needed  Sees Dr. Thibodeaux Chriopractor In [place]\" disc problems    Pneumonia \"As A Child\"    No Current Symtoms    Shortness of breath on exertion     Tinnitus of both ears      Past Surgical History:   Procedure Laterality Date    CARPAL TUNNEL RELEASE  2005    Right    CATARACT REMOVAL      bid    CERVICAL FUSION N/A 11/06/2023    C3-5    COLONOSCOPY  2010 due 2020    COLONOSCOPY N/A 11/07/2022    COLONOSCOPY POLYPECTOMY SNARE/COLD BIOPSY performed by Kaylynn Sung MD at [facility] OR    FINGER SURGERY  1976    Reattached Right Little Finger Due To Table Saw Accident    INGUINAL HERNIA REPAIR  09/11/2012    Left     OTHER SURGICAL HISTORY      Family Physician Is Dr. Hoang Weems In [place], [place],

## (undated) DEVICE — ENDOSCOPY KIT: Brand: MEDLINE INDUSTRIES, INC.

## (undated) DEVICE — SNARE ENDOSCP L240CM SHTH DIA24MM LOOP W10MM POLYP RND REINF